# Patient Record
Sex: MALE | Race: WHITE | URBAN - METROPOLITAN AREA
[De-identification: names, ages, dates, MRNs, and addresses within clinical notes are randomized per-mention and may not be internally consistent; named-entity substitution may affect disease eponyms.]

---

## 2018-10-31 PROBLEM — R01.1 MURMUR: Status: ACTIVE | Noted: 2018-10-31

## 2018-10-31 PROBLEM — R06.02 SOB (SHORTNESS OF BREATH): Status: ACTIVE | Noted: 2018-10-31

## 2019-02-26 PROBLEM — R00.1 BRADYCARDIA: Status: ACTIVE | Noted: 2019-02-26

## 2023-09-15 RX ORDER — TADALAFIL 5 MG/1
TABLET ORAL
Qty: 90 TABLET | Refills: 0 | Status: SHIPPED | OUTPATIENT
Start: 2023-09-15

## 2023-09-15 NOTE — TELEPHONE ENCOUNTER
Yusuf Disla is calling to request a refill on the following medication(s):    Medication Request:  Requested Prescriptions     Pending Prescriptions Disp Refills    tadalafil (CIALIS) 5 MG tablet [Pharmacy Med Name: Tadalafil 5 MG Oral Tablet] 90 tablet 0     Sig: TAKE 1 TABLET BY MOUTH ONCE DAILY AS NEEDED       Last Visit Date (If Applicable):  Visit date not found    Next Visit Date:    Visit date not found

## 2023-09-26 RX ORDER — CELECOXIB 100 MG/1
CAPSULE ORAL
Qty: 180 CAPSULE | Refills: 3 | Status: SHIPPED | OUTPATIENT
Start: 2023-09-26

## 2023-09-26 NOTE — TELEPHONE ENCOUNTER
Risa Blackwood is calling to request a refill on the following medication(s):    Medication Request:  Requested Prescriptions     Pending Prescriptions Disp Refills    celecoxib (CELEBREX) 100 MG capsule [Pharmacy Med Name: CELECOXIB CAPS 100MG] 180 capsule 3     Sig: TAKE 1 CAPSULE TWICE A DAY WITH FOOD       Last Visit Date (If Applicable):  Visit date not found    Next Visit Date:    Visit date not found

## 2023-12-11 RX ORDER — TADALAFIL 5 MG/1
TABLET ORAL
Qty: 90 TABLET | Refills: 0 | Status: SHIPPED | OUTPATIENT
Start: 2023-12-11

## 2023-12-11 NOTE — TELEPHONE ENCOUNTER
Greer Torres is calling to request a refill on the following medication(s):    Medication Request:  Requested Prescriptions     Pending Prescriptions Disp Refills    tadalafil (CIALIS) 5 MG tablet [Pharmacy Med Name: Tadalafil 5 MG Oral Tablet] 90 tablet 0     Sig: TAKE 1 TABLET BY MOUTH ONCE DAILY AS NEEDED       Last Visit Date (If Applicable):  Visit date not found    Next Visit Date:    Visit date not found

## 2023-12-14 ENCOUNTER — OFFICE VISIT (OUTPATIENT)
Age: 73
End: 2023-12-14
Payer: COMMERCIAL

## 2023-12-14 VITALS
OXYGEN SATURATION: 96 % | SYSTOLIC BLOOD PRESSURE: 144 MMHG | HEIGHT: 70 IN | RESPIRATION RATE: 12 BRPM | DIASTOLIC BLOOD PRESSURE: 98 MMHG | TEMPERATURE: 98.7 F | BODY MASS INDEX: 33.36 KG/M2 | WEIGHT: 233 LBS | HEART RATE: 54 BPM

## 2023-12-14 DIAGNOSIS — H65.03 NON-RECURRENT ACUTE SEROUS OTITIS MEDIA OF BOTH EARS: ICD-10-CM

## 2023-12-14 DIAGNOSIS — I10 ESSENTIAL HYPERTENSION: ICD-10-CM

## 2023-12-14 DIAGNOSIS — R42 VERTIGO: Primary | ICD-10-CM

## 2023-12-14 PROBLEM — N52.9 IMPOTENCE: Status: ACTIVE | Noted: 2023-12-14

## 2023-12-14 PROBLEM — E78.00 HYPERCHOLESTEROLEMIA: Status: ACTIVE | Noted: 2023-12-14

## 2023-12-14 PROBLEM — J40 BRONCHITIS: Status: ACTIVE | Noted: 2023-12-14

## 2023-12-14 PROCEDURE — 1123F ACP DISCUSS/DSCN MKR DOCD: CPT | Performed by: FAMILY MEDICINE

## 2023-12-14 PROCEDURE — 3080F DIAST BP >= 90 MM HG: CPT | Performed by: FAMILY MEDICINE

## 2023-12-14 PROCEDURE — 3077F SYST BP >= 140 MM HG: CPT | Performed by: FAMILY MEDICINE

## 2023-12-14 PROCEDURE — 99213 OFFICE O/P EST LOW 20 MIN: CPT | Performed by: FAMILY MEDICINE

## 2023-12-14 RX ORDER — CEPHALEXIN 500 MG/1
500 CAPSULE ORAL 3 TIMES DAILY
Qty: 30 CAPSULE | Refills: 0 | Status: SHIPPED | OUTPATIENT
Start: 2023-12-14 | End: 2023-12-24

## 2023-12-14 RX ORDER — VALSARTAN 160 MG/1
TABLET ORAL
COMMUNITY
Start: 2023-10-03

## 2023-12-14 RX ORDER — ATORVASTATIN CALCIUM 80 MG/1
TABLET, FILM COATED ORAL
COMMUNITY
Start: 2023-10-08

## 2023-12-14 RX ORDER — MECLIZINE HCL 12.5 MG/1
12.5 TABLET ORAL 3 TIMES DAILY PRN
Qty: 30 TABLET | Refills: 1 | Status: SHIPPED | OUTPATIENT
Start: 2023-12-14 | End: 2023-12-24

## 2023-12-14 RX ORDER — FLUTICASONE PROPIONATE 50 MCG
2 SPRAY, SUSPENSION (ML) NASAL DAILY
Qty: 16 G | Refills: 0 | Status: SHIPPED | OUTPATIENT
Start: 2023-12-14

## 2023-12-14 SDOH — ECONOMIC STABILITY: HOUSING INSECURITY
IN THE LAST 12 MONTHS, WAS THERE A TIME WHEN YOU DID NOT HAVE A STEADY PLACE TO SLEEP OR SLEPT IN A SHELTER (INCLUDING NOW)?: NO

## 2023-12-14 SDOH — ECONOMIC STABILITY: FOOD INSECURITY: WITHIN THE PAST 12 MONTHS, THE FOOD YOU BOUGHT JUST DIDN'T LAST AND YOU DIDN'T HAVE MONEY TO GET MORE.: NEVER TRUE

## 2023-12-14 SDOH — ECONOMIC STABILITY: INCOME INSECURITY: HOW HARD IS IT FOR YOU TO PAY FOR THE VERY BASICS LIKE FOOD, HOUSING, MEDICAL CARE, AND HEATING?: NOT HARD AT ALL

## 2023-12-14 SDOH — ECONOMIC STABILITY: FOOD INSECURITY: WITHIN THE PAST 12 MONTHS, YOU WORRIED THAT YOUR FOOD WOULD RUN OUT BEFORE YOU GOT MONEY TO BUY MORE.: NEVER TRUE

## 2023-12-14 NOTE — PROGRESS NOTES
[facility]     Date of Visit:  2023  Patient Name: Eris Anders   Patient :  1950     CHIEF COMPLAINT/HPI:     Eris Anders is a 68 y.o. 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[employer] and does do a lot of head motion seems to make things worse no nausea or vomiting he had similar incidents about a year ago    REVIEW OF SYSTEM      Review of Systems  No fever no sweats no chills no nausea no vomiting no diarrhea he does have balance disorder especially with head motion some frontal sinus congestion no fever sweats or chills    REVIEWED INFORMATION      Allergies   Allergen Reactions    Latex     Alcohol     Dust Mite Extract        Current Outpatient Medications   Medication Sig Note Dispense Refill    atorvastatin (LIPITOR) 80 MG tablet        valsartan (DIOVAN) 160 MG tablet        cephALEXin (KEFLEX) 500 MG capsule Take 1 capsule by mouth 3 times daily for 10 days  30 capsule 0    fluticasone (FLONASE) 50 MCG/ACT nasal spray 2 sprays by Each Nostril route daily  16 g 0    meclizine (ANTIVERT) 12.5 MG tablet Take 1 tablet by mouth 3 times daily as needed for Dizziness  30 tablet 1    tadalafil (CIALIS) 5 MG tablet TAKE 1 TABLET BY MOUTH ONCE DAILY AS NEEDED  90 tablet 0    celecoxib (CELEBREX) 100 MG capsule TAKE 1 CAPSULE TWICE A DAY WITH FOOD  180 capsule 3    aspirin 81 MG tablet Take 1 tablet by mouth daily       valsartan (DIOVAN) 80 MG tablet TAKE 1 TABLET DAILY (Patient not taking: Reported on 2023)  90 tablet 2    atorvastatin (LIPITOR) 40 MG tablet TAKE 1 TABLET DAILY (Patient not taking: Reported on 2023)  90 tablet 3    TOPROL XL 25 MG XL tablet Take 12.5 mg by mouth daily (Patient not taking: Reported on 2023) 2016: Received

## 2023-12-26 ENCOUNTER — OFFICE VISIT (OUTPATIENT)
Age: 73
End: 2023-12-26
Payer: COMMERCIAL

## 2023-12-26 VITALS
TEMPERATURE: 97.2 F | HEART RATE: 57 BPM | SYSTOLIC BLOOD PRESSURE: 138 MMHG | WEIGHT: 230.2 LBS | DIASTOLIC BLOOD PRESSURE: 86 MMHG | HEIGHT: 70 IN | OXYGEN SATURATION: 95 % | RESPIRATION RATE: 18 BRPM | BODY MASS INDEX: 32.96 KG/M2

## 2023-12-26 DIAGNOSIS — J40 BRONCHITIS: ICD-10-CM

## 2023-12-26 DIAGNOSIS — J01.00 ACUTE NON-RECURRENT MAXILLARY SINUSITIS: Primary | ICD-10-CM

## 2023-12-26 PROCEDURE — 99213 OFFICE O/P EST LOW 20 MIN: CPT | Performed by: FAMILY MEDICINE

## 2023-12-26 PROCEDURE — 1123F ACP DISCUSS/DSCN MKR DOCD: CPT | Performed by: FAMILY MEDICINE

## 2023-12-26 PROCEDURE — 3079F DIAST BP 80-89 MM HG: CPT | Performed by: FAMILY MEDICINE

## 2023-12-26 PROCEDURE — 3075F SYST BP GE 130 - 139MM HG: CPT | Performed by: FAMILY MEDICINE

## 2023-12-26 RX ORDER — METHYLPREDNISOLONE ACETATE 80 MG/ML
80 INJECTION, SUSPENSION INTRA-ARTICULAR; INTRALESIONAL; INTRAMUSCULAR; SOFT TISSUE ONCE
Status: COMPLETED | OUTPATIENT
Start: 2023-12-26 | End: 2023-12-26

## 2023-12-26 RX ORDER — BENZONATATE 200 MG/1
200 CAPSULE ORAL 3 TIMES DAILY PRN
Qty: 30 CAPSULE | Refills: 0 | Status: SHIPPED | OUTPATIENT
Start: 2023-12-26 | End: 2024-01-02

## 2023-12-26 RX ORDER — CLINDAMYCIN HYDROCHLORIDE 300 MG/1
300 CAPSULE ORAL 3 TIMES DAILY
Qty: 30 CAPSULE | Refills: 0 | Status: SHIPPED | OUTPATIENT
Start: 2023-12-26 | End: 2024-01-05

## 2023-12-26 RX ADMIN — METHYLPREDNISOLONE ACETATE 80 MG: 80 INJECTION, SUSPENSION INTRA-ARTICULAR; INTRALESIONAL; INTRAMUSCULAR; SOFT TISSUE at 15:23

## 2023-12-26 NOTE — PROGRESS NOTES
[facility]      Date of Visit:  2023  [facility]   Patient :  1950     CHIEF COMPLAINT/HPI:     Jory Spence is a 68 y.o. male who presents today for an general visit to be evaluated for the following condition(s):  Chief Complaint   Patient presents with    Cough     Patient is here for ongoing cough and congestion that has gotten worse. States last week when he was here he was having issues with dizziness but that has subsided. States productive cough w/green mucus      Patient recently has developed a fairly severe sinus and bronchial infection. He has cough maybe some low-grade fever at home he has been off work since the . Laryngitis also.   REVIEW OF SYSTEM      Review of Systems  May be low-grade fever at home some sweats and chills he is not sleeping well because of congestion he has lots of nasal congestion and rhinorrhea and coughing up some yellowish material no nausea no vomiting energy level is poor he does have laryngitis also    REVIEWED INFORMATION      Allergies   Allergen Reactions    Latex     Alcohol     Dust Mite Extract     Cephalexin Rash       Current Outpatient Medications   Medication Sig Dispense Refill    clindamycin (CLEOCIN) 300 MG capsule Take 1 capsule by mouth 3 times daily for 10 days 30 capsule 0    benzonatate (TESSALON) 200 MG capsule Take 1 capsule by mouth 3 times daily as needed for Cough 30 capsule 0    atorvastatin (LIPITOR) 80 MG tablet Take 1 tablet by mouth daily      valsartan (DIOVAN) 160 MG tablet Take 1 tablet by mouth daily      fluticasone (FLONASE) 50 MCG/ACT nasal spray 2 sprays by Each Nostril route daily 16 g 0    tadalafil (CIALIS) 5 MG tablet TAKE 1 TABLET BY MOUTH ONCE DAILY AS NEEDED 90 tablet 0    celecoxib (CELEBREX) 100 MG capsule TAKE 1 CAPSULE TWICE A DAY WITH FOOD 180 capsule 3    aspirin 81 MG tablet Take 1 tablet by mouth daily       Current Facility-Administered Medications   Medication Dose

## 2023-12-26 NOTE — PROGRESS NOTES
Depo 80mg was given to patient for Bronchitis. Tolerated well. Approved by Dr. Monae Amezcua.  Injection was given let hip

## 2023-12-29 ENCOUNTER — TELEPHONE (OUTPATIENT)
Age: 73
End: 2023-12-29

## 2023-12-29 NOTE — TELEPHONE ENCOUNTER
Patient is calling about a doctor note for the [address] and he asking for it to extended to January 2nd.  He is using his ARAM and we are closed Monday

## 2024-01-03 ENCOUNTER — TELEPHONE (OUTPATIENT)
Age: 74
End: 2024-01-03

## 2024-01-03 ENCOUNTER — OFFICE VISIT (OUTPATIENT)
Age: 74
End: 2024-01-03
Payer: COMMERCIAL

## 2024-01-03 VITALS
BODY MASS INDEX: 32.93 KG/M2 | SYSTOLIC BLOOD PRESSURE: 136 MMHG | DIASTOLIC BLOOD PRESSURE: 86 MMHG | OXYGEN SATURATION: 96 % | WEIGHT: 230 LBS | TEMPERATURE: 98.4 F | HEIGHT: 70 IN | HEART RATE: 61 BPM

## 2024-01-03 DIAGNOSIS — J01.10 ACUTE NON-RECURRENT FRONTAL SINUSITIS: ICD-10-CM

## 2024-01-03 DIAGNOSIS — J04.10 TRACHEITIS: Primary | ICD-10-CM

## 2024-01-03 PROCEDURE — 3075F SYST BP GE 130 - 139MM HG: CPT | Performed by: FAMILY MEDICINE

## 2024-01-03 PROCEDURE — 1123F ACP DISCUSS/DSCN MKR DOCD: CPT | Performed by: FAMILY MEDICINE

## 2024-01-03 PROCEDURE — 99213 OFFICE O/P EST LOW 20 MIN: CPT | Performed by: FAMILY MEDICINE

## 2024-01-03 PROCEDURE — 3079F DIAST BP 80-89 MM HG: CPT | Performed by: FAMILY MEDICINE

## 2024-01-03 RX ORDER — DOXYCYCLINE HYCLATE 100 MG
100 TABLET ORAL 2 TIMES DAILY
Qty: 20 TABLET | Refills: 0 | Status: SHIPPED | OUTPATIENT
Start: 2024-01-03 | End: 2024-01-13

## 2024-01-03 RX ORDER — HYDROCODONE POLISTIREX AND CHLORPHENIRAMINE POLISTIREX 10; 8 MG/5ML; MG/5ML
5 SUSPENSION, EXTENDED RELEASE ORAL EVERY 12 HOURS PRN
Qty: 118 ML | Refills: 0 | Status: SHIPPED | OUTPATIENT
Start: 2024-01-03 | End: 2024-01-10

## 2024-01-03 RX ORDER — PREDNISONE 10 MG/1
10 TABLET ORAL 2 TIMES DAILY
Qty: 10 TABLET | Refills: 0 | Status: SHIPPED | OUTPATIENT
Start: 2024-01-03 | End: 2024-01-08

## 2024-01-03 ASSESSMENT — PATIENT HEALTH QUESTIONNAIRE - PHQ9
SUM OF ALL RESPONSES TO PHQ9 QUESTIONS 1 & 2: 0
SUM OF ALL RESPONSES TO PHQ QUESTIONS 1-9: 0
SUM OF ALL RESPONSES TO PHQ QUESTIONS 1-9: 0
1. LITTLE INTEREST OR PLEASURE IN DOING THINGS: 0
SUM OF ALL RESPONSES TO PHQ QUESTIONS 1-9: 0
2. FEELING DOWN, DEPRESSED OR HOPELESS: 0
SUM OF ALL RESPONSES TO PHQ QUESTIONS 1-9: 0

## 2024-01-03 NOTE — TELEPHONE ENCOUNTER
Patient  called  and  he  is  on  his  second  round  of  antibiotic  and  he  is still  blowing  out  green  mucous  from  his  nose  and  he  is  not  feeling  well and  needs  to  go  back  to  work  tomorrow  do you need  to  see him  again

## 2024-01-03 NOTE — PROGRESS NOTES
[facility]     Date of Visit:  1/3/2024  Patient Name: Cameron Washington   Patient :  1950     CHIEF COMPLAINT/HPI:     Cameron Washington is a 73 y.o. male who presents today for an general visit to be evaluated for the following condition(s):  Chief Complaint   Patient presents with    Cough     Patient is on 2nd round of antibiotics for cough/congestion. Still not getting much relief.      Patient continues with sinus congestion frontal sinus pressure postnasal drainage and laryngitis.  Recent chest x-ray was clear.  REVIEW OF SYSTEM      Review of Systems  No fever no chills he does have sinus pressure especially frontal sinuses rhinorrhea postnasal drainage laryngitis cough    REVIEWED INFORMATION      Allergies   Allergen Reactions    Latex     Alcohol     Dust Mite Extract     Cephalexin Rash       Current Outpatient Medications   Medication Sig Dispense Refill    predniSONE (DELTASONE) 10 MG tablet Take 1 tablet by mouth 2 times daily for 5 days 10 tablet 0    doxycycline hyclate (VIBRA-TABS) 100 MG tablet Take 1 tablet by mouth 2 times daily for 10 days 20 tablet 0    HYDROcodone-chlorpheniramine (TUSSIONEX) 10-8 MG/5ML SUER Take 5 mLs by mouth every 12 hours as needed (cough) for up to 7 days. Max Daily Amount: 10 mLs 118 mL 0    atorvastatin (LIPITOR) 80 MG tablet Take 1 tablet by mouth daily      valsartan (DIOVAN) 160 MG tablet Take 1 tablet by mouth daily      fluticasone (FLONASE) 50 MCG/ACT nasal spray 2 sprays by Each Nostril route daily 16 g 0    tadalafil (CIALIS) 5 MG tablet TAKE 1 TABLET BY MOUTH ONCE DAILY AS NEEDED 90 tablet 0    celecoxib (CELEBREX) 100 MG capsule TAKE 1 CAPSULE TWICE A DAY WITH FOOD 180 capsule 3    aspirin 81 MG tablet Take 1 tablet by mouth daily       No current facility-administered medications for this visit.        Patient Active Problem List   Diagnosis    Coronary artery disease involving native coronary artery of native heart with angina pectoris

## 2024-01-12 ENCOUNTER — TELEPHONE (OUTPATIENT)
Age: 74
End: 2024-01-12

## 2024-02-12 ENCOUNTER — TELEPHONE (OUTPATIENT)
Age: 74
End: 2024-02-12

## 2024-02-12 RX ORDER — DOXYCYCLINE HYCLATE 100 MG
100 TABLET ORAL 2 TIMES DAILY
Qty: 20 TABLET | Refills: 0 | Status: SHIPPED | OUTPATIENT
Start: 2024-02-12 | End: 2024-02-22

## 2024-02-12 NOTE — TELEPHONE ENCOUNTER
Patient having head congestion again but this time having some dizzy spells. Symptoms started yesterday.    Both ears are plugged.  Has been using flonase    Can something be called in?  Apt needed?    Patient aware Dr FARRAR not in today - ok to wait for response tomorrow

## 2024-02-17 ENCOUNTER — APPOINTMENT (OUTPATIENT)
Dept: CT IMAGING | Age: 74
End: 2024-02-17
Payer: COMMERCIAL

## 2024-02-17 ENCOUNTER — APPOINTMENT (OUTPATIENT)
Dept: GENERAL RADIOLOGY | Age: 74
End: 2024-02-17
Payer: COMMERCIAL

## 2024-02-17 ENCOUNTER — HOSPITAL ENCOUNTER (EMERGENCY)
Age: 74
Discharge: HOME | End: 2024-02-17
Attending: STUDENT IN AN ORGANIZED HEALTH CARE EDUCATION/TRAINING PROGRAM
Payer: COMMERCIAL

## 2024-02-17 VITALS
OXYGEN SATURATION: 97 % | BODY MASS INDEX: 33.64 KG/M2 | HEART RATE: 55 BPM | RESPIRATION RATE: 17 BRPM | DIASTOLIC BLOOD PRESSURE: 81 MMHG | TEMPERATURE: 98.2 F | SYSTOLIC BLOOD PRESSURE: 143 MMHG | HEIGHT: 70 IN | WEIGHT: 235 LBS

## 2024-02-17 DIAGNOSIS — R11.2 NAUSEA AND VOMITING, UNSPECIFIED VOMITING TYPE: ICD-10-CM

## 2024-02-17 DIAGNOSIS — R42 DIZZINESS: Primary | ICD-10-CM

## 2024-02-17 LAB
ALBUMIN SERPL-MCNC: 3.9 G/DL (ref 3.5–5.2)
ALP SERPL-CCNC: 57 U/L (ref 40–129)
ALT SERPL-CCNC: 23 U/L (ref 5–41)
ANION GAP SERPL CALCULATED.3IONS-SCNC: 12 MMOL/L (ref 9–17)
AST SERPL-CCNC: 19 U/L
BASOPHILS # BLD: 0 K/UL (ref 0–0.2)
BASOPHILS NFR BLD: 0 % (ref 0–2)
BILIRUB SERPL-MCNC: 0.5 MG/DL (ref 0.3–1.2)
BUN SERPL-MCNC: 19 MG/DL (ref 8–23)
CALCIUM SERPL-MCNC: 8.9 MG/DL (ref 8.6–10.4)
CHLORIDE SERPL-SCNC: 103 MMOL/L (ref 98–107)
CO2 SERPL-SCNC: 24 MMOL/L (ref 20–31)
CREAT SERPL-MCNC: 0.8 MG/DL (ref 0.7–1.2)
EKG ATRIAL RATE: 53 BPM
EKG P AXIS: 42 DEGREES
EKG P-R INTERVAL: 174 MS
EKG Q-T INTERVAL: 472 MS
EKG QRS DURATION: 114 MS
EKG QTC CALCULATION (BAZETT): 442 MS
EKG R AXIS: 25 DEGREES
EKG T AXIS: 104 DEGREES
EKG VENTRICULAR RATE: 53 BPM
EOSINOPHIL # BLD: 0.1 K/UL (ref 0–0.4)
EOSINOPHILS RELATIVE PERCENT: 1 % (ref 0–4)
ERYTHROCYTE [DISTWIDTH] IN BLOOD BY AUTOMATED COUNT: 14.7 % (ref 11.5–14.9)
FLUAV RNA RESP QL NAA+PROBE: NOT DETECTED
FLUBV RNA RESP QL NAA+PROBE: NOT DETECTED
GFR SERPL CREATININE-BSD FRML MDRD: >60 ML/MIN/1.73M2
GLUCOSE SERPL-MCNC: 147 MG/DL (ref 70–99)
HCT VFR BLD AUTO: 41.1 % (ref 41–53)
HGB BLD-MCNC: 13.9 G/DL (ref 13.5–17.5)
INR PPP: 1
LYMPHOCYTES NFR BLD: 3.3 K/UL (ref 1–4.8)
LYMPHOCYTES RELATIVE PERCENT: 38 % (ref 24–44)
MAGNESIUM SERPL-MCNC: 2 MG/DL (ref 1.6–2.6)
MCH RBC QN AUTO: 31 PG (ref 26–34)
MCHC RBC AUTO-ENTMCNC: 33.8 G/DL (ref 31–37)
MCV RBC AUTO: 91.7 FL (ref 80–100)
MONOCYTES NFR BLD: 0.6 K/UL (ref 0.1–1.3)
MONOCYTES NFR BLD: 7 % (ref 1–7)
NEUTROPHILS NFR BLD: 54 % (ref 36–66)
NEUTS SEG NFR BLD: 4.6 K/UL (ref 1.3–9.1)
PLATELET # BLD AUTO: 236 K/UL (ref 150–450)
PMV BLD AUTO: 7.2 FL (ref 6–12)
POTASSIUM SERPL-SCNC: 3.2 MMOL/L (ref 3.7–5.3)
PROT SERPL-MCNC: 6.3 G/DL (ref 6.4–8.3)
PROTHROMBIN TIME: 13.4 SEC (ref 11.8–14.6)
RBC # BLD AUTO: 4.48 M/UL (ref 4.5–5.9)
SARS-COV-2 RNA RESP QL NAA+PROBE: NOT DETECTED
SODIUM SERPL-SCNC: 139 MMOL/L (ref 135–144)
SOURCE: NORMAL
SPECIMEN DESCRIPTION: NORMAL
TROPONIN I SERPL HS-MCNC: 14 NG/L (ref 0–22)
WBC OTHER # BLD: 8.7 K/UL (ref 3.5–11)

## 2024-02-17 PROCEDURE — 87636 SARSCOV2 & INF A&B AMP PRB: CPT

## 2024-02-17 PROCEDURE — 70450 CT HEAD/BRAIN W/O DYE: CPT

## 2024-02-17 PROCEDURE — 6360000002 HC RX W HCPCS: Performed by: STUDENT IN AN ORGANIZED HEALTH CARE EDUCATION/TRAINING PROGRAM

## 2024-02-17 PROCEDURE — 36415 COLL VENOUS BLD VENIPUNCTURE: CPT

## 2024-02-17 PROCEDURE — 85025 COMPLETE CBC W/AUTO DIFF WBC: CPT

## 2024-02-17 PROCEDURE — 84484 ASSAY OF TROPONIN QUANT: CPT

## 2024-02-17 PROCEDURE — 93005 ELECTROCARDIOGRAM TRACING: CPT | Performed by: STUDENT IN AN ORGANIZED HEALTH CARE EDUCATION/TRAINING PROGRAM

## 2024-02-17 PROCEDURE — 71045 X-RAY EXAM CHEST 1 VIEW: CPT

## 2024-02-17 PROCEDURE — 96374 THER/PROPH/DIAG INJ IV PUSH: CPT

## 2024-02-17 PROCEDURE — 70498 CT ANGIOGRAPHY NECK: CPT

## 2024-02-17 PROCEDURE — 99285 EMERGENCY DEPT VISIT HI MDM: CPT

## 2024-02-17 PROCEDURE — 6370000000 HC RX 637 (ALT 250 FOR IP): Performed by: STUDENT IN AN ORGANIZED HEALTH CARE EDUCATION/TRAINING PROGRAM

## 2024-02-17 PROCEDURE — 85610 PROTHROMBIN TIME: CPT

## 2024-02-17 PROCEDURE — 2580000003 HC RX 258: Performed by: STUDENT IN AN ORGANIZED HEALTH CARE EDUCATION/TRAINING PROGRAM

## 2024-02-17 PROCEDURE — 6360000004 HC RX CONTRAST MEDICATION: Performed by: STUDENT IN AN ORGANIZED HEALTH CARE EDUCATION/TRAINING PROGRAM

## 2024-02-17 PROCEDURE — 83735 ASSAY OF MAGNESIUM: CPT

## 2024-02-17 PROCEDURE — 96375 TX/PRO/DX INJ NEW DRUG ADDON: CPT

## 2024-02-17 PROCEDURE — 80053 COMPREHEN METABOLIC PANEL: CPT

## 2024-02-17 RX ORDER — POTASSIUM CHLORIDE 20 MEQ/1
40 TABLET, EXTENDED RELEASE ORAL ONCE
Status: COMPLETED | OUTPATIENT
Start: 2024-02-17 | End: 2024-02-17

## 2024-02-17 RX ORDER — ONDANSETRON 4 MG/1
4 TABLET, ORALLY DISINTEGRATING ORAL 3 TIMES DAILY PRN
Qty: 21 TABLET | Refills: 0 | Status: SHIPPED | OUTPATIENT
Start: 2024-02-17

## 2024-02-17 RX ORDER — 0.9 % SODIUM CHLORIDE 0.9 %
1000 INTRAVENOUS SOLUTION INTRAVENOUS ONCE
Status: COMPLETED | OUTPATIENT
Start: 2024-02-17 | End: 2024-02-17

## 2024-02-17 RX ORDER — SODIUM CHLORIDE 0.9 % (FLUSH) 0.9 %
10 SYRINGE (ML) INJECTION PRN
Status: DISCONTINUED | OUTPATIENT
Start: 2024-02-17 | End: 2024-02-17 | Stop reason: HOSPADM

## 2024-02-17 RX ORDER — METOCLOPRAMIDE HYDROCHLORIDE 5 MG/ML
10 INJECTION INTRAMUSCULAR; INTRAVENOUS ONCE
Status: COMPLETED | OUTPATIENT
Start: 2024-02-17 | End: 2024-02-17

## 2024-02-17 RX ORDER — 0.9 % SODIUM CHLORIDE 0.9 %
100 INTRAVENOUS SOLUTION INTRAVENOUS ONCE
Status: COMPLETED | OUTPATIENT
Start: 2024-02-17 | End: 2024-02-17

## 2024-02-17 RX ORDER — MECLIZINE HYDROCHLORIDE 25 MG/1
25 TABLET ORAL 3 TIMES DAILY PRN
Qty: 30 TABLET | Refills: 0 | Status: SHIPPED | OUTPATIENT
Start: 2024-02-17 | End: 2024-02-27

## 2024-02-17 RX ORDER — DIPHENHYDRAMINE HYDROCHLORIDE 50 MG/ML
25 INJECTION INTRAMUSCULAR; INTRAVENOUS ONCE
Status: COMPLETED | OUTPATIENT
Start: 2024-02-17 | End: 2024-02-17

## 2024-02-17 RX ADMIN — SODIUM CHLORIDE, PRESERVATIVE FREE 10 ML: 5 INJECTION INTRAVENOUS at 05:22

## 2024-02-17 RX ADMIN — SODIUM CHLORIDE 100 ML: 9 INJECTION, SOLUTION INTRAVENOUS at 05:22

## 2024-02-17 RX ADMIN — IOPAMIDOL 75 ML: 755 INJECTION, SOLUTION INTRAVENOUS at 05:21

## 2024-02-17 RX ADMIN — SODIUM CHLORIDE 1000 ML: 9 INJECTION, SOLUTION INTRAVENOUS at 04:14

## 2024-02-17 RX ADMIN — DIPHENHYDRAMINE HYDROCHLORIDE 25 MG: 50 INJECTION INTRAMUSCULAR; INTRAVENOUS at 04:15

## 2024-02-17 RX ADMIN — POTASSIUM CHLORIDE 40 MEQ: 1500 TABLET, EXTENDED RELEASE ORAL at 05:42

## 2024-02-17 RX ADMIN — METOCLOPRAMIDE 10 MG: 5 INJECTION, SOLUTION INTRAMUSCULAR; INTRAVENOUS at 04:16

## 2024-02-17 ASSESSMENT — LIFESTYLE VARIABLES
HOW MANY STANDARD DRINKS CONTAINING ALCOHOL DO YOU HAVE ON A TYPICAL DAY: PATIENT DOES NOT DRINK
HOW OFTEN DO YOU HAVE A DRINK CONTAINING ALCOHOL: NEVER

## 2024-02-17 ASSESSMENT — PAIN - FUNCTIONAL ASSESSMENT: PAIN_FUNCTIONAL_ASSESSMENT: NONE - DENIES PAIN

## 2024-02-17 NOTE — ED NOTES
Patient states he does not feel any dizziness, no nausea, and states he feels much better now than earlier.

## 2024-02-17 NOTE — ED NOTES
Discharge instructions reviewed with patient, noting all directions and education by provider. Patient verbalizes understanding of all information reviewed, gathered personal items, and transferred under own power off unit to lobby without incident.

## 2024-02-17 NOTE — ED NOTES
Mode of arrival (squad #, walk in, police, etc) : EMS        Chief complaint(s): dizziness, hypertension, nausea and vomiting    Above symptoms started earlier today. EMS given Nitro and Zofran IV. Iv established at left dorsal FA gauge 18.        C= \"Have you ever felt that you should Cut down on your drinking?\"  No  A= \"Have people Annoyed you by criticizing your drinking?\"  No  G= \"Have you ever felt bad or Guilty about your drinking?\"  No  E= \"Have you ever had a drink as an Eye-opener first thing in the morning to steady your nerves or to help a hangover?\"  No      Deferred []      Reason for deferring: N/A    *If yes to two or more: probable alcohol abuse.*

## 2024-02-17 NOTE — ED PROVIDER NOTES
EMERGENCY DEPARTMENT ENCOUNTER    Pt Name: Cameron Washington  MRN: [id number]  Birthdate 1950  Date of evaluation: 2/17/24  CHIEF COMPLAINT       Chief Complaint   Patient presents with    Nausea    Emesis    Dizziness     HISTORY OF PRESENT ILLNESS   73-year-old history of hypertension, hyperlipidemia, CAD presenting with dizziness    Patient states he has had episodes of vertigo in the past    Over the last week it has been getting worse    He describes a room spinning and feeling dizzy and lightheaded with some nausea associated vomiting    Denies any numbness tingling weakness slurred speech blurry vision or double vision              REVIEW OF SYSTEMS     Review of Systems   Constitutional:  Negative for chills and fever.   HENT:  Negative for rhinorrhea and sore throat.    Eyes:  Negative for discharge and redness.   Respiratory:  Negative for chest tightness and shortness of breath.    Cardiovascular:  Negative for chest pain.   Gastrointestinal:  Negative for abdominal pain, diarrhea, nausea and vomiting.   Genitourinary:  Negative for dysuria and frequency.   Musculoskeletal:  Negative for arthralgias and myalgias.   Skin:  Negative for rash.   Neurological:  Positive for dizziness and light-headedness. Negative for weakness and numbness.   Psychiatric/Behavioral:  Negative for suicidal ideas.    All other systems reviewed and are negative.    PASTMEDICAL HISTORY   No past medical history on file.  Past Problem List  Patient Active Problem List   Diagnosis Code    Coronary artery disease involving native coronary artery of native heart with angina pectoris (HCC) I25.119    Essential hypertension I10    Mixed hyperlipidemia E78.2    SOB (shortness of breath) R06.02    Murmur R01.1    Bradycardia R00.1    Bronchitis J40    Hypertension I10    Hypercholesterolemia E78.00    Impotence N52.9     SURGICAL HISTORY     No past surgical history on file.  CURRENT MEDICATIONS       Previous Medications    ASPIRIN 81  infiltrates representing atelectasis versus pneumonia. [GUILLERMO]   0544 CT Head W/O Contrast  No acute intracranial abnormality.      [GUILLERMO]   0609 COVID-19 & Influenza Combo:    Specimen Description .NASOPHARYNGEAL SWAB   SOURCE, [id number] .NASOPHARYNGEAL SWAB   SARS-CoV-2 RNA, RT PCR Not Detected   INFLUENZA A Not Detected   INFLUENZA B Not Detected  negative [GUILLERMO]   0612 CTA HEAD NECK W CONTRAST  No significant stenosis or evidence for occlusion. [GUILLERMO]      ED Course User Index  [GUILLERMO] Hernan Yañez MD       Patient repeat assessment: Symptoms resolved    Disposition discussion with patient/family, Shared Decision Making: Discussed with the patient Zofran and meclizine as needed for dizziness    Although the primary doctor NuPrep return immediately for numbness ting weakness slurred speech      73-year-old dizziness has had this in the past    Workup is reassuring including CT head CTA    Symptoms resolved after treatment here    Suspect of peripheral vertigo we will have him follow-up with the primary doctor return for any worsening      PROCEDURES:    EKG 12 Lead    Date/Time: 2/17/2024 4:38 AM    Performed by: Hernan Yañez MD  Authorized by: Hernan Yañez MD    ECG interpreted by ED Physician in the absence of a cardiologist: yes    Interpretation:     Interpretation: abnormal    Rate:     ECG rate:  52    ECG rate assessment: bradycardic    Rhythm:     Rhythm: sinus bradycardia    Ectopy:     Ectopy: none    QRS:     QRS axis:  Normal    QRS intervals:  Normal    QRS conduction: normal    ST segments:     ST segments:  Normal  T waves:     T waves: inverted      Inverted:  V4, V5 and V6        DATA FOR LAB AND RADIOLOGY TESTS ORDERED BELOW ARE REVIEWED BY THE ED CLINICIAN:    RADIOLOGY: All x-rays, CT, MRI, and formal ultrasound images (except ED bedside ultrasound) are read by the radiologist, see reports below, unless otherwise noted in MDM or here.  Reports below are reviewed by myself.  CT Head W/O

## 2024-02-20 ENCOUNTER — OFFICE VISIT (OUTPATIENT)
Age: 74
End: 2024-02-20
Payer: COMMERCIAL

## 2024-02-20 VITALS
DIASTOLIC BLOOD PRESSURE: 70 MMHG | WEIGHT: 236.6 LBS | BODY MASS INDEX: 33.95 KG/M2 | RESPIRATION RATE: 14 BRPM | HEART RATE: 51 BPM | SYSTOLIC BLOOD PRESSURE: 130 MMHG | TEMPERATURE: 97.8 F | OXYGEN SATURATION: 97 %

## 2024-02-20 DIAGNOSIS — I10 ESSENTIAL HYPERTENSION: ICD-10-CM

## 2024-02-20 DIAGNOSIS — R42 VERTIGO: Primary | ICD-10-CM

## 2024-02-20 DIAGNOSIS — J01.10 ACUTE NON-RECURRENT FRONTAL SINUSITIS: ICD-10-CM

## 2024-02-20 LAB
EKG ATRIAL RATE: 52 BPM
EKG P AXIS: 46 DEGREES
EKG P-R INTERVAL: 176 MS
EKG Q-T INTERVAL: 474 MS
EKG QRS DURATION: 114 MS
EKG QTC CALCULATION (BAZETT): 440 MS
EKG R AXIS: 26 DEGREES
EKG T AXIS: 110 DEGREES
EKG VENTRICULAR RATE: 52 BPM

## 2024-02-20 PROCEDURE — 93010 ELECTROCARDIOGRAM REPORT: CPT | Performed by: INTERNAL MEDICINE

## 2024-02-20 PROCEDURE — 1123F ACP DISCUSS/DSCN MKR DOCD: CPT | Performed by: FAMILY MEDICINE

## 2024-02-20 PROCEDURE — 3075F SYST BP GE 130 - 139MM HG: CPT | Performed by: FAMILY MEDICINE

## 2024-02-20 PROCEDURE — 3078F DIAST BP <80 MM HG: CPT | Performed by: FAMILY MEDICINE

## 2024-02-20 PROCEDURE — 99214 OFFICE O/P EST MOD 30 MIN: CPT | Performed by: FAMILY MEDICINE

## 2024-02-20 NOTE — PROGRESS NOTES
[facility]     Date of Visit:  2024  Patient Name: Cameron Washington   Patient :  1950     CHIEF COMPLAINT/HPI:     Cameron Washington is a 73 y.o. male who presents today for an general visit to be evaluated for the following condition(s):  Chief Complaint   Patient presents with    Post ER     Patient  is   here  for  Post ER  for Vertigo  was seen  at  [facility]  24 -went  taken  by  EMS  he was  vomiting - on going issue with the vertigo  since       ER report was reviewed.  Patient had acute onset of vertigo over the weekend after he was finished with work.  He had acute vertigo nausea and vomiting he was evaluated in the emergency room CT of the brain and CTA of the brain were both negative laboratory studies were negative except for slightly low potassium 3.2.  Probably due to vomiting.  This will be repeated.  He wants to go back to work on   REVIEW OF SYSTEM      Review of Systems    Vertigo and nausea have completely resolved.  Patient feels just a little congestion in the frontal sinus area no sinusitis was noted on CT of the sinuses visualized during pregnancy  REVIEWED INFORMATION      Allergies   Allergen Reactions    Latex     Alcohol     Dust Mite Extract     Cephalexin Rash       Current Outpatient Medications   Medication Sig Dispense Refill    ondansetron (ZOFRAN-ODT) 4 MG disintegrating tablet Take 1 tablet by mouth 3 times daily as needed for Nausea or Vomiting 21 tablet 0    meclizine (ANTIVERT) 25 MG tablet Take 1 tablet by mouth 3 times daily as needed for Dizziness 30 tablet 0    doxycycline hyclate (VIBRA-TABS) 100 MG tablet Take 1 tablet by mouth 2 times daily for 10 days 20 tablet 0    atorvastatin (LIPITOR) 80 MG tablet Take 1 tablet by mouth daily      valsartan (DIOVAN) 160 MG tablet Take 1 tablet by mouth daily      fluticasone (FLONASE) 50 MCG/ACT nasal spray 2 sprays by Each Nostril route daily 16 g 0    tadalafil (CIALIS) 5 MG tablet

## 2024-02-26 ENCOUNTER — TELEPHONE (OUTPATIENT)
Age: 74
End: 2024-02-26

## 2024-02-26 NOTE — TELEPHONE ENCOUNTER
Patient is requesting a phone call with Dr FARRAR, he states that he needs to discuss his recent eopisodes of vertigo and possibly having to take some time off of work, I explained to him that you were seeing patients today and couldn't call him until possibly this evening.  Patient understood

## 2024-02-26 NOTE — TELEPHONE ENCOUNTER
Spoke to  patient  and  he  needs  a note  to  be off  work  from  02/23/24 and  RTW on 03/13/24

## 2024-02-26 NOTE — TELEPHONE ENCOUNTER
Is call patient, we can keep him off work because of his vertigo.  What days is he thinking about ?  And we can issue appropriate off work slip

## 2024-02-27 NOTE — TELEPHONE ENCOUNTER
FMLA fax received from [employer]  Spoke with patient. Off work due to vertigo. Start date is 2/16/24 and will return 3/13/24    Form given to Dr FARRAR to complete and sign

## 2024-03-01 DIAGNOSIS — M17.0 PRIMARY OSTEOARTHRITIS OF BOTH KNEES: ICD-10-CM

## 2024-03-01 DIAGNOSIS — M51.9 LUMBAR DISC DISEASE: Primary | ICD-10-CM

## 2024-03-01 DIAGNOSIS — I71.21 ANEURYSM OF ASCENDING AORTA WITHOUT RUPTURE (HCC): ICD-10-CM

## 2024-03-01 DIAGNOSIS — L03.116 CELLULITIS OF LEFT LEG: ICD-10-CM

## 2024-03-01 DIAGNOSIS — I77.810 ASCENDING AORTA DILATATION (HCC): ICD-10-CM

## 2024-03-01 DIAGNOSIS — K57.92 DIVERTICULITIS: ICD-10-CM

## 2024-03-01 DIAGNOSIS — J32.0 CHRONIC MAXILLARY SINUSITIS: ICD-10-CM

## 2024-03-01 DIAGNOSIS — R35.0 FREQUENCY OF URINATION: ICD-10-CM
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
## 2024-03-04 ENCOUNTER — HOSPITAL ENCOUNTER (OUTPATIENT)
Age: 74
Setting detail: SPECIMEN
Discharge: HOME | End: 2024-03-04

## 2024-03-04 ENCOUNTER — OFFICE VISIT (OUTPATIENT)
Age: 74
End: 2024-03-04
Payer: COMMERCIAL

## 2024-03-04 ENCOUNTER — HOSPITAL ENCOUNTER (OUTPATIENT)
Age: 74
Setting detail: THERAPIES SERIES
Discharge: HOME | End: 2024-03-04
Attending: FAMILY MEDICINE
Payer: COMMERCIAL

## 2024-03-04 VITALS
BODY MASS INDEX: 33.81 KG/M2 | TEMPERATURE: 97.8 F | RESPIRATION RATE: 14 BRPM | OXYGEN SATURATION: 98 % | WEIGHT: 235.6 LBS | HEART RATE: 64 BPM | DIASTOLIC BLOOD PRESSURE: 80 MMHG | SYSTOLIC BLOOD PRESSURE: 138 MMHG

## 2024-03-04 DIAGNOSIS — I10 ESSENTIAL HYPERTENSION: ICD-10-CM

## 2024-03-04 DIAGNOSIS — I10 ESSENTIAL HYPERTENSION: Primary | ICD-10-CM

## 2024-03-04 LAB
ANION GAP SERPL CALCULATED.3IONS-SCNC: 14 MMOL/L (ref 9–17)
BUN SERPL-MCNC: 17 MG/DL (ref 8–23)
CALCIUM SERPL-MCNC: 8.9 MG/DL (ref 8.6–10.4)
CHLORIDE SERPL-SCNC: 103 MMOL/L (ref 98–107)
CO2 SERPL-SCNC: 22 MMOL/L (ref 20–31)
CREAT SERPL-MCNC: 0.7 MG/DL (ref 0.7–1.2)
GFR SERPL CREATININE-BSD FRML MDRD: >60 ML/MIN/1.73M2
GLUCOSE SERPL-MCNC: 94 MG/DL (ref 70–99)
POTASSIUM SERPL-SCNC: 4 MMOL/L (ref 3.7–5.3)
SODIUM SERPL-SCNC: 139 MMOL/L (ref 135–144)

## 2024-03-04 PROCEDURE — 1123F ACP DISCUSS/DSCN MKR DOCD: CPT | Performed by: FAMILY MEDICINE

## 2024-03-04 PROCEDURE — 3079F DIAST BP 80-89 MM HG: CPT | Performed by: FAMILY MEDICINE

## 2024-03-04 PROCEDURE — 99213 OFFICE O/P EST LOW 20 MIN: CPT | Performed by: FAMILY MEDICINE

## 2024-03-04 PROCEDURE — 3075F SYST BP GE 130 - 139MM HG: CPT | Performed by: FAMILY MEDICINE

## 2024-03-04 PROCEDURE — 95992 CANALITH REPOSITIONING PROC: CPT

## 2024-03-04 PROCEDURE — 97161 PT EVAL LOW COMPLEX 20 MIN: CPT

## 2024-03-04 NOTE — PROGRESS NOTES
[facility]     Date of Visit:  3/4/2024  Patient Name: Cameron Washington   Patient :  1950     CHIEF COMPLAINT/HPI:     Cameron Washington is a 73 y.o. male who presents today for an general visit to be evaluated for the following condition(s):  Chief Complaint   Patient presents with    Dizziness     Patient is  here for  recheck on  Vertigo  going to   PT  today      Vertigo is slowly improving.  He still has a little lightheadedness with head motion.  No nausea no vomiting.  Needs a new lab slip.  He will start vestibular rehab today.  Patient is done with antibiotics for his left otitis media.  He is using Flonase at the present time not much in the way of sinus congestion nor postnasal drip  REVIEW OF SYSTEM      Review of Systems  Transient lightheadedness as mentioned above no fever no sweats no chills patient complains of some fullness in the left ear    REVIEWED INFORMATION      Allergies   Allergen Reactions    Latex     Alcohol     Dust Mite Extract     Cephalexin Rash       Current Outpatient Medications   Medication Sig Dispense Refill    ondansetron (ZOFRAN-ODT) 4 MG disintegrating tablet Take 1 tablet by mouth 3 times daily as needed for Nausea or Vomiting 21 tablet 0    atorvastatin (LIPITOR) 80 MG tablet Take 1 tablet by mouth daily      valsartan (DIOVAN) 160 MG tablet Take 1 tablet by mouth daily      fluticasone (FLONASE) 50 MCG/ACT nasal spray 2 sprays by Each Nostril route daily 16 g 0    tadalafil (CIALIS) 5 MG tablet TAKE 1 TABLET BY MOUTH ONCE DAILY AS NEEDED 90 tablet 0    celecoxib (CELEBREX) 100 MG capsule TAKE 1 CAPSULE TWICE A DAY WITH FOOD 180 capsule 3    aspirin 81 MG tablet Take 1 tablet by mouth daily       No current facility-administered medications for this visit.        Patient Active Problem List   Diagnosis    Coronary artery disease involving native coronary artery of native heart with angina pectoris (HCC)    Essential hypertension    Mixed hyperlipidemia

## 2024-03-04 NOTE — PATIENT INSTRUCTIONS
You are now able to schedule your own appointments by using your [employer] dale or you can always call the office to schedule at [phone].

## 2024-03-04 NOTE — CONSULTS
[] [facility]  Outpatient Rehabilitation &  Therapy  [address]  [phone]  F: [phone] [] [facility]  Outpatient Rehabilitation &  Therapy  [address]   [address]  P: [phone]  F: [phone] [] [facility]  Outpatient Rehabilitation &  Therapy  [address]  P: [phone]  F: [phone] [] [facility]  Outpatient Rehabilitation &  Therapy  [address]  P: [phone]  F: [phone] [] [facility]  Outpatient Rehabilitation &  Therapy  [address]   [address]   P: [phone]  F: [phone]  [x] [facility]  Outpatient Rehabilitation &  Therapy  [address]   P: [phone]  F: [phone] [] [facility]  Outpatient Rehabilitation &  Therapy  [address]  P: [phone]  F: [phone] [] [facility]  Outpatient Rehabilitation &  Therapy  [address]  [address]  P: [phone]  F: [phone] [] [facility]  Outpatient Rehabilitation &  Therapy  [address]  P: [phone]  F: [phone]      Physical Therapy Vestibular Rehab Evaluation    Date:  3/4/2024  Patient: Cameron Washington  : 1950  MRN: [id number]  Physician: Lve Leigh MD   Insurance: [employer] PPO: BMN, 20% coinsurance  Medical Diagnosis: R42  Rehab Codes: H81.12, R26.89  Onset date: 2024  Next 's appt.: 3/26/2024    Subjective:   CC: R ear feels plugged. Had mild infection in L ear. Vertigo/spinning, nausea, vomiting. Has difficulty with looking up, getting out of bed quick. Bumps into walls at times  HPI: (onset date) was ill before xmas, has had episodes of dizziness since at work and went to ER. Dr Leigh found liquid behind his L eardrum. He reports significant hearing loss over last year R ear. Tripped and fell at work    Comorbidities:   [] Obesity []

## 2024-03-11 ENCOUNTER — TELEPHONE (OUTPATIENT)
Age: 74
End: 2024-03-11

## 2024-03-11 NOTE — TELEPHONE ENCOUNTER
Patient is requesting a return to work note to return on 3/13/24. He was off due to vertigo. Please call when ready and he will  [phone]

## 2024-03-12 ENCOUNTER — TELEPHONE (OUTPATIENT)
Age: 74
End: 2024-03-12

## 2024-03-12 NOTE — TELEPHONE ENCOUNTER
Patient was here 3/4, Needed work note, Note Attached to form. Patient now states he was given Return to work forms that need filled out. Forms put in business basket. Will  when ready [phone]

## 2024-03-12 NOTE — TELEPHONE ENCOUNTER
Patient advised, he said he did have appt with his eye Dr today to \"rule things out\" they said they do not notice anything wrong with his eyes.

## 2024-03-13 ENCOUNTER — HOSPITAL ENCOUNTER (OUTPATIENT)
Age: 74
Setting detail: THERAPIES SERIES
Discharge: HOME | End: 2024-03-13
Attending: FAMILY MEDICINE
Payer: COMMERCIAL

## 2024-03-13 PROCEDURE — 95992 CANALITH REPOSITIONING PROC: CPT

## 2024-03-15 NOTE — TELEPHONE ENCOUNTER
Dr FARRAR completed form.  Left voice message for patient advising ready for .  Placed in binder at

## 2024-03-18 RX ORDER — TADALAFIL 5 MG/1
TABLET ORAL
Qty: 90 TABLET | Refills: 0 | Status: SHIPPED | OUTPATIENT
Start: 2024-03-18

## 2024-03-18 NOTE — TELEPHONE ENCOUNTER
Cameron Washington is calling to request a refill on the following medication(s):    Medication Request:  Requested Prescriptions     Pending Prescriptions Disp Refills    tadalafil (CIALIS) 5 MG tablet [Pharmacy Med Name: Tadalafil 5 MG Oral Tablet] 90 tablet 0     Sig: TAKE 1 TABLET BY MOUTH ONCE DAILY AS NEEDED       Last Visit Date (If Applicable):  Visit date not found    Next Visit Date:    Visit date not found

## 2024-04-09 ENCOUNTER — OFFICE VISIT (OUTPATIENT)
Age: 74
End: 2024-04-09
Payer: COMMERCIAL

## 2024-04-09 VITALS
DIASTOLIC BLOOD PRESSURE: 80 MMHG | TEMPERATURE: 98.9 F | RESPIRATION RATE: 14 BRPM | HEART RATE: 50 BPM | OXYGEN SATURATION: 98 % | WEIGHT: 232.2 LBS | SYSTOLIC BLOOD PRESSURE: 130 MMHG | BODY MASS INDEX: 33.32 KG/M2

## 2024-04-09 DIAGNOSIS — H81.01 MENIERE DISEASE, RIGHT: Primary | ICD-10-CM

## 2024-04-09 DIAGNOSIS — R42 VERTIGO: ICD-10-CM

## 2024-04-09 DIAGNOSIS — R53.83 OTHER FATIGUE: ICD-10-CM

## 2024-04-09 PROCEDURE — 3075F SYST BP GE 130 - 139MM HG: CPT | Performed by: FAMILY MEDICINE

## 2024-04-09 PROCEDURE — 99214 OFFICE O/P EST MOD 30 MIN: CPT | Performed by: FAMILY MEDICINE

## 2024-04-09 PROCEDURE — 3079F DIAST BP 80-89 MM HG: CPT | Performed by: FAMILY MEDICINE

## 2024-04-09 PROCEDURE — 1123F ACP DISCUSS/DSCN MKR DOCD: CPT | Performed by: FAMILY MEDICINE

## 2024-04-09 RX ORDER — PREDNISONE 10 MG/1
10 TABLET ORAL 2 TIMES DAILY
Qty: 10 TABLET | Refills: 0 | Status: SHIPPED | OUTPATIENT
Start: 2024-04-09 | End: 2024-04-19

## 2024-04-09 RX ORDER — POTASSIUM CHLORIDE 750 MG/1
10 TABLET, EXTENDED RELEASE ORAL DAILY
Qty: 30 TABLET | Refills: 2 | Status: SHIPPED | OUTPATIENT
Start: 2024-04-09

## 2024-04-09 RX ORDER — HYDROCHLOROTHIAZIDE 25 MG/1
25 TABLET ORAL EVERY MORNING
Qty: 30 TABLET | Refills: 2 | Status: SHIPPED | OUTPATIENT
Start: 2024-04-09

## 2024-04-10 ENCOUNTER — HOSPITAL ENCOUNTER (OUTPATIENT)
Age: 74
Setting detail: SPECIMEN
Discharge: HOME | End: 2024-04-10

## 2024-04-10 DIAGNOSIS — R42 VERTIGO: ICD-10-CM

## 2024-04-10 DIAGNOSIS — H81.01 MENIERE DISEASE, RIGHT: ICD-10-CM

## 2024-04-10 LAB
ALBUMIN SERPL-MCNC: 4.3 G/DL (ref 3.5–5.2)
ALBUMIN/GLOB SERPL: 2 {RATIO} (ref 1–2.5)
ALP SERPL-CCNC: 58 U/L (ref 40–129)
ALT SERPL-CCNC: 29 U/L (ref 10–50)
ANION GAP SERPL CALCULATED.3IONS-SCNC: 12 MMOL/L (ref 9–16)
AST SERPL-CCNC: 24 U/L (ref 10–50)
BILIRUB DIRECT SERPL-MCNC: 0.3 MG/DL (ref 0–0.3)
BILIRUB INDIRECT SERPL-MCNC: 0.9 MG/DL (ref 0–1)
BILIRUB SERPL-MCNC: 1.2 MG/DL (ref 0–1.2)
BUN SERPL-MCNC: 16 MG/DL (ref 8–23)
CALCIUM SERPL-MCNC: 9.3 MG/DL (ref 8.6–10.4)
CHLORIDE SERPL-SCNC: 103 MMOL/L (ref 98–107)
CO2 SERPL-SCNC: 26 MMOL/L (ref 20–31)
CREAT SERPL-MCNC: 0.9 MG/DL (ref 0.7–1.2)
ERYTHROCYTE [DISTWIDTH] IN BLOOD BY AUTOMATED COUNT: 13.1 % (ref 11.8–14.4)
GFR SERPL CREATININE-BSD FRML MDRD: 86 ML/MIN/1.73M2
GLUCOSE SERPL-MCNC: 100 MG/DL (ref 74–99)
HCT VFR BLD AUTO: 47.5 % (ref 40.7–50.3)
HGB BLD-MCNC: 16.1 G/DL (ref 13–17)
MCH RBC QN AUTO: 30.7 PG (ref 25.2–33.5)
MCHC RBC AUTO-ENTMCNC: 33.9 G/DL (ref 28.4–34.8)
MCV RBC AUTO: 90.5 FL (ref 82.6–102.9)
NRBC BLD-RTO: 0 PER 100 WBC
PLATELET # BLD AUTO: 265 K/UL (ref 138–453)
PMV BLD AUTO: 9.4 FL (ref 8.1–13.5)
POTASSIUM SERPL-SCNC: 3.8 MMOL/L (ref 3.7–5.3)
PROT SERPL-MCNC: 7 G/DL (ref 6.6–8.7)
RBC # BLD AUTO: 5.25 M/UL (ref 4.21–5.77)
SODIUM SERPL-SCNC: 141 MMOL/L (ref 136–145)
TSH SERPL DL<=0.05 MIU/L-ACNC: 2.26 UIU/ML (ref 0.27–4.2)
WBC OTHER # BLD: 8.3 K/UL (ref 3.5–11.3)

## 2024-04-12 ENCOUNTER — HOSPITAL ENCOUNTER (OUTPATIENT)
Age: 74
Setting detail: SPECIMEN
Discharge: HOME | End: 2024-04-12

## 2024-04-12 DIAGNOSIS — R42 VERTIGO: ICD-10-CM

## 2024-04-12 DIAGNOSIS — H81.01 MENIERE DISEASE, RIGHT: ICD-10-CM

## 2024-04-12 LAB
BILIRUB UR QL STRIP: NEGATIVE
CLARITY UR: CLEAR
COLOR UR: YELLOW
COMMENT: NORMAL
GLUCOSE UR STRIP-MCNC: NEGATIVE MG/DL
HGB UR QL STRIP.AUTO: NEGATIVE
KETONES UR STRIP-MCNC: NEGATIVE MG/DL
LEUKOCYTE ESTERASE UR QL STRIP: NEGATIVE
NITRITE UR QL STRIP: NEGATIVE
PH UR STRIP: 5.5 [PH] (ref 5–8)
PROT UR STRIP-MCNC: NEGATIVE MG/DL
SP GR UR STRIP: 1.02 (ref 1–1.03)
UROBILINOGEN UR STRIP-ACNC: NORMAL EU/DL (ref 0–1)

## 2024-04-16 ENCOUNTER — TELEPHONE (OUTPATIENT)
Age: 74
End: 2024-04-16

## 2024-04-16 NOTE — TELEPHONE ENCOUNTER
Received fax forms from [employer].  Patient was seen 4/09/24 was advised to stop working.    Forms given to Dr FARRAR to complete

## 2024-04-17 ENCOUNTER — HOSPITAL ENCOUNTER (OUTPATIENT)
Dept: MRI IMAGING | Age: 74
Discharge: HOME | End: 2024-04-19
Attending: FAMILY MEDICINE
Payer: COMMERCIAL

## 2024-04-17 DIAGNOSIS — R42 VERTIGO: ICD-10-CM

## 2024-04-17 DIAGNOSIS — H81.01 MENIERE DISEASE, RIGHT: ICD-10-CM

## 2024-04-17 PROCEDURE — 6360000004 HC RX CONTRAST MEDICATION: Performed by: FAMILY MEDICINE

## 2024-04-17 PROCEDURE — 70553 MRI BRAIN STEM W/O & W/DYE: CPT

## 2024-04-17 PROCEDURE — A9579 GAD-BASE MR CONTRAST NOS,1ML: HCPCS | Performed by: FAMILY MEDICINE

## 2024-04-17 RX ADMIN — GADOTERIDOL 20 ML: 279.3 INJECTION, SOLUTION INTRAVENOUS at 07:59

## 2024-04-18 ENCOUNTER — TELEPHONE (OUTPATIENT)
Age: 74
End: 2024-04-18

## 2024-04-18 RX ORDER — MECLIZINE HYDROCHLORIDE 25 MG/1
25 TABLET ORAL 3 TIMES DAILY PRN
Qty: 15 TABLET | Refills: 1 | Status: SHIPPED | OUTPATIENT
Start: 2024-04-18 | End: 2024-04-28

## 2024-04-18 NOTE — TELEPHONE ENCOUNTER
Patient states that he had a MRI on his brain and today he has been very dizzy and lightheaded from the test. He states that as he was eating something he turned his head and got a dizzy spell again. States that his ENT apt. Is next week and he will see you after that.

## 2024-04-24 ENCOUNTER — TELEPHONE (OUTPATIENT)
Age: 74
End: 2024-04-24

## 2024-04-24 NOTE — TELEPHONE ENCOUNTER
Patient dropped off [employer] application form that needs filled out. LOV-4-9-2024, NOV 4-. Would like call when ready, can  on day of appt

## 2024-04-29 ENCOUNTER — OFFICE VISIT (OUTPATIENT)
Age: 74
End: 2024-04-29
Payer: COMMERCIAL

## 2024-04-29 VITALS
HEART RATE: 72 BPM | OXYGEN SATURATION: 94 % | WEIGHT: 232.4 LBS | DIASTOLIC BLOOD PRESSURE: 80 MMHG | HEIGHT: 70 IN | SYSTOLIC BLOOD PRESSURE: 136 MMHG | BODY MASS INDEX: 33.27 KG/M2

## 2024-04-29 DIAGNOSIS — I10 ESSENTIAL HYPERTENSION: Primary | ICD-10-CM

## 2024-04-29 DIAGNOSIS — H81.01 MENIERE DISEASE, RIGHT: ICD-10-CM

## 2024-04-29 PROCEDURE — 3075F SYST BP GE 130 - 139MM HG: CPT | Performed by: FAMILY MEDICINE

## 2024-04-29 PROCEDURE — 99213 OFFICE O/P EST LOW 20 MIN: CPT | Performed by: FAMILY MEDICINE

## 2024-04-29 PROCEDURE — 3079F DIAST BP 80-89 MM HG: CPT | Performed by: FAMILY MEDICINE

## 2024-04-29 PROCEDURE — 1123F ACP DISCUSS/DSCN MKR DOCD: CPT | Performed by: FAMILY MEDICINE
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
## 2024-04-30 ENCOUNTER — TELEPHONE (OUTPATIENT)
Age: 74
End: 2024-04-30

## 2024-04-30 NOTE — TELEPHONE ENCOUNTER
Paperwork from [employer] is with Dr FARRAR to complete and sign.    Patient is off work pending upcoming apt on 6/18/24    Once completed fax both forms  to   [employer]  [phone]  Claim # [id number]

## 2024-06-05 ENCOUNTER — TELEPHONE (OUTPATIENT)
Age: 74
End: 2024-06-05

## 2024-06-05 NOTE — TELEPHONE ENCOUNTER
Pt has been getting dizzy  suddenly  sometimes the room spins  and also gotten sick. 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 Notify pt

## 2024-06-10 ENCOUNTER — OFFICE VISIT (OUTPATIENT)
Age: 74
End: 2024-06-10
Payer: COMMERCIAL

## 2024-06-10 VITALS
DIASTOLIC BLOOD PRESSURE: 68 MMHG | TEMPERATURE: 98 F | WEIGHT: 237.8 LBS | HEART RATE: 52 BPM | RESPIRATION RATE: 14 BRPM | SYSTOLIC BLOOD PRESSURE: 128 MMHG | OXYGEN SATURATION: 98 % | BODY MASS INDEX: 34.12 KG/M2

## 2024-06-10 DIAGNOSIS — R06.02 SHORTNESS OF BREATH: ICD-10-CM

## 2024-06-10 DIAGNOSIS — R00.1 BRADYCARDIA: ICD-10-CM

## 2024-06-10 DIAGNOSIS — H81.13 BENIGN PAROXYSMAL POSITIONAL VERTIGO DUE TO BILATERAL VESTIBULAR DISORDER: Primary | ICD-10-CM

## 2024-06-10 PROCEDURE — 99213 OFFICE O/P EST LOW 20 MIN: CPT | Performed by: FAMILY MEDICINE

## 2024-06-10 PROCEDURE — 1123F ACP DISCUSS/DSCN MKR DOCD: CPT | Performed by: FAMILY MEDICINE

## 2024-06-10 PROCEDURE — 3074F SYST BP LT 130 MM HG: CPT | Performed by: FAMILY MEDICINE

## 2024-06-10 PROCEDURE — 3078F DIAST BP <80 MM HG: CPT | Performed by: FAMILY MEDICINE

## 2024-06-10 NOTE — PROGRESS NOTES
[facility]  [facility] MEDICINE  [address]  [place] [address]     Date of Visit:  6/10/2024  Patient Name: Cameron Washington   Patient :  1950     CHIEF COMPLAINT/HPI:     Chief Complaint   Patient presents with    Dizziness     Patient is  here for  dizziness - patient  is  to  go back  to  work  next  week  but  his  dizziness is  back         HPI      Cameron Washington, 73 y.o. presents today regarding dizziness.     He's been struggling with vertigo starting before . Over the winter he developed bronchitis and had a few episodes of dizziness with the infection. 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 He felt dizzy while driving two days later. He's had 6-7 bouts of dizziness over the past couple of weeks including an episode this morning. He reports significant hearing loss in his right ear.  He experiences extremely elevated blood pressure readings when episodes occur and recalls seeing 200/112. He feels lightheaded and has sinus pressure over his forehead. He denies palpitations or tachycardia. Denies chest pain, however he's noticed increase in shortness of breath. He reports waking up at 3 am gagging and eventually vomited. He's been going to vestibular therapy with his most recent session being last Tuesday. He takes Meclizine as needed on days when he's not going to physical therapy with benefit. He does not drink coffee. He's cut back on caffeine and drinks 1/2 mountain dew and 1/2 lemonade. He does not consume alcohol or smoke. He follows with the eye doctor routinely.     He is eating ok. 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     He had an MRI of his brain 2024 which showed, \"Atrophy and chronic microvascular disease without acute intracranial  abnormality. Unremarkable pre and post-contrast MRI of the internal auditory canals.\" He had a CTA of his head and neck 2024

## 2024-06-11 ENCOUNTER — TELEPHONE (OUTPATIENT)
Age: 74
End: 2024-06-11

## 2024-06-11 NOTE — TELEPHONE ENCOUNTER
Received form form [employer]    Spoke with patient. He would like for us to hold off completing as he needs to see ENT. He also sees Dr FARRAR  next week.    Will keep form and update more after we hear from patient

## 2024-06-18 ENCOUNTER — HOSPITAL ENCOUNTER (OUTPATIENT)
Age: 74
Discharge: HOME | End: 2024-06-20
Attending: FAMILY MEDICINE
Payer: COMMERCIAL

## 2024-06-18 VITALS — HEIGHT: 70 IN | BODY MASS INDEX: 33.93 KG/M2 | WEIGHT: 237 LBS

## 2024-06-18 DIAGNOSIS — R00.1 BRADYCARDIA: ICD-10-CM

## 2024-06-18 DIAGNOSIS — R06.02 SHORTNESS OF BREATH: ICD-10-CM

## 2024-06-18 LAB
ECHO AO ASC DIAM: 4.5 CM
ECHO AO ASCENDING AORTA INDEX: 2.01 CM/M2
ECHO AO ROOT DIAM: 4.3 CM
ECHO AO ROOT INDEX: 1.92 CM/M2
ECHO AV AREA PEAK VELOCITY: 3 CM2
ECHO AV AREA VTI: 3.1 CM2
ECHO AV AREA/BSA PEAK VELOCITY: 1.3 CM2/M2
ECHO AV AREA/BSA VTI: 1.4 CM2/M2
ECHO AV MEAN GRADIENT: 7 MMHG
ECHO AV MEAN VELOCITY: 1.3 M/S
ECHO AV PEAK GRADIENT: 12 MMHG
ECHO AV PEAK VELOCITY: 1.8 M/S
ECHO AV VELOCITY RATIO: 0.61
ECHO AV VTI: 42.3 CM
ECHO BSA: 2.3 M2
ECHO BSA: 2.3 M2
ECHO EST RA PRESSURE: 3 MMHG
ECHO IVC EXP: 1.4 CM
ECHO IVC INSP: 0.6 CM
ECHO LA AREA 2C: 24.5 CM2
ECHO LA AREA 4C: 25.1 CM2
ECHO LA DIAMETER INDEX: 1.88 CM/M2
ECHO LA DIAMETER: 4.2 CM
ECHO LA MAJOR AXIS: 6.3 CM
ECHO LA MINOR AXIS: 6.1 CM
ECHO LA TO AORTIC ROOT RATIO: 0.98
ECHO LA VOL BP: 81 ML (ref 18–58)
ECHO LA VOL MOD A2C: 82 ML (ref 18–58)
ECHO LA VOL MOD A4C: 79 ML (ref 18–58)
ECHO LA VOL/BSA BIPLANE: 36 ML/M2 (ref 16–34)
ECHO LA VOLUME INDEX MOD A2C: 37 ML/M2 (ref 16–34)
ECHO LA VOLUME INDEX MOD A4C: 35 ML/M2 (ref 16–34)
ECHO LV E' LATERAL VELOCITY: 9 CM/S
ECHO LV E' SEPTAL VELOCITY: 5 CM/S
ECHO LV FRACTIONAL SHORTENING: 33 % (ref 28–44)
ECHO LV INTERNAL DIMENSION DIASTOLE INDEX: 2.01 CM/M2
ECHO LV INTERNAL DIMENSION DIASTOLIC: 4.5 CM (ref 4.2–5.9)
ECHO LV INTERNAL DIMENSION SYSTOLIC INDEX: 1.34 CM/M2
ECHO LV INTERNAL DIMENSION SYSTOLIC: 3 CM
ECHO LV IVSD: 1.3 CM (ref 0.6–1)
ECHO LV MASS 2D: 222.6 G (ref 88–224)
ECHO LV MASS INDEX 2D: 99.4 G/M2 (ref 49–115)
ECHO LV POSTERIOR WALL DIASTOLIC: 1.3 CM (ref 0.6–1)
ECHO LV RELATIVE WALL THICKNESS RATIO: 0.58
ECHO LVOT AREA: 4.9 CM2
ECHO LVOT AV VTI INDEX: 0.64
ECHO LVOT DIAM: 2.5 CM
ECHO LVOT MEAN GRADIENT: 3 MMHG
ECHO LVOT PEAK GRADIENT: 4 MMHG
ECHO LVOT PEAK VELOCITY: 1.1 M/S
ECHO LVOT STROKE VOLUME INDEX: 59.1 ML/M2
ECHO LVOT SV: 132.5 ML
ECHO LVOT VTI: 27 CM
ECHO MV A VELOCITY: 0.8 M/S
ECHO MV E DECELERATION TIME (DT): 272 MS
ECHO MV E VELOCITY: 0.83 M/S
ECHO MV E/A RATIO: 1.04
ECHO MV E/E' LATERAL: 9.22
ECHO MV E/E' RATIO (AVERAGED): 12.91
ECHO MV E/E' SEPTAL: 16.6
ECHO RIGHT VENTRICULAR SYSTOLIC PRESSURE (RVSP): 21 MMHG
ECHO RV BASAL DIMENSION: 3.8 CM
ECHO RV FREE WALL PEAK S': 14 CM/S
ECHO TV REGURGITANT MAX VELOCITY: 2.14 M/S
ECHO TV REGURGITANT PEAK GRADIENT: 18 MMHG

## 2024-06-18 PROCEDURE — 93225 XTRNL ECG REC<48 HRS REC: CPT

## 2024-06-18 PROCEDURE — 93306 TTE W/DOPPLER COMPLETE: CPT

## 2024-06-18 PROCEDURE — 93306 TTE W/DOPPLER COMPLETE: CPT | Performed by: INTERNAL MEDICINE

## 2024-06-24 LAB — ECHO BSA: 2.3 M2

## 2024-06-25 ENCOUNTER — TELEPHONE (OUTPATIENT)
Age: 74
End: 2024-06-25

## 2024-06-25 NOTE — TELEPHONE ENCOUNTER
Patient called to say that he still wants to wait for us to complete [employer] form.    He will contact us when he is ready for Dr FARRAR to complete

## 2024-06-25 NOTE — TELEPHONE ENCOUNTER
Patient called requesting results of echo--advised you are not in until tomorrow he said that is okay     Please call patient---echo shows normal emptying capacity of the heart there was some mild age-related thickening of the valve but no significant leakage or stenosis.  Size of the heart was normal however aorta just above the heart was high normal in diameter which means that we should repeat echocardiogram in a year.  Basically no significant abnormalities were found on echo

## 2024-06-26 NOTE — TELEPHONE ENCOUNTER
Patient called back and was given message. He is scheduled to come in on 07/02/24 so he can be cleared to go back to work

## 2024-07-01 ENCOUNTER — HOSPITAL ENCOUNTER (OUTPATIENT)
Dept: CT IMAGING | Age: 74
Discharge: HOME | End: 2024-07-03
Attending: FAMILY MEDICINE
Payer: COMMERCIAL

## 2024-07-01 DIAGNOSIS — I77.810 AORTIC ROOT DILATION (HCC): ICD-10-CM

## 2024-07-01 LAB — CREAT BLD-MCNC: 1.1 MG/DL (ref 0.6–1.4)

## 2024-07-01 PROCEDURE — 82565 ASSAY OF CREATININE: CPT

## 2024-07-01 PROCEDURE — 71275 CT ANGIOGRAPHY CHEST: CPT

## 2024-07-01 PROCEDURE — 2580000003 HC RX 258: Performed by: FAMILY MEDICINE

## 2024-07-01 PROCEDURE — 6360000004 HC RX CONTRAST MEDICATION: Performed by: FAMILY MEDICINE

## 2024-07-01 RX ORDER — HYDROCHLOROTHIAZIDE 25 MG/1
25 TABLET ORAL EVERY MORNING
Qty: 30 TABLET | Refills: 5 | Status: SHIPPED | OUTPATIENT
Start: 2024-07-01

## 2024-07-01 RX ORDER — SODIUM CHLORIDE 0.9 % (FLUSH) 0.9 %
10 SYRINGE (ML) INJECTION PRN
Status: DISCONTINUED | OUTPATIENT
Start: 2024-07-01 | End: 2024-07-04 | Stop reason: HOSPADM

## 2024-07-01 RX ORDER — 0.9 % SODIUM CHLORIDE 0.9 %
80 INTRAVENOUS SOLUTION INTRAVENOUS ONCE
Status: COMPLETED | OUTPATIENT
Start: 2024-07-01 | End: 2024-07-01

## 2024-07-01 RX ADMIN — IOPAMIDOL 100 ML: 755 INJECTION, SOLUTION INTRAVENOUS at 10:02

## 2024-07-01 RX ADMIN — SODIUM CHLORIDE 80 ML: 9 INJECTION, SOLUTION INTRAVENOUS at 10:02

## 2024-07-01 RX ADMIN — SODIUM CHLORIDE, PRESERVATIVE FREE 10 ML: 5 INJECTION INTRAVENOUS at 10:02

## 2024-07-01 NOTE — TELEPHONE ENCOUNTER
Cameron Washington is calling to request a refill on the following medication(s):    Medication Request:  Requested Prescriptions     Pending Prescriptions Disp Refills    hydroCHLOROthiazide (HYDRODIURIL) 25 MG tablet [Pharmacy Med Name: HYDROCHLOROTHIAZIDE 25 MG TAB] 30 tablet 2     Sig: take 1 tablet by mouth every morning       Last Visit Date (If Applicable):  4/29/2024    Next Visit Date:    7/2/2024

## 2024-07-02 ENCOUNTER — OFFICE VISIT (OUTPATIENT)
Age: 74
End: 2024-07-02
Payer: COMMERCIAL

## 2024-07-02 ENCOUNTER — TELEPHONE (OUTPATIENT)
Age: 74
End: 2024-07-02

## 2024-07-02 VITALS
DIASTOLIC BLOOD PRESSURE: 60 MMHG | HEART RATE: 48 BPM | WEIGHT: 242.8 LBS | TEMPERATURE: 97.6 F | BODY MASS INDEX: 34.84 KG/M2 | SYSTOLIC BLOOD PRESSURE: 112 MMHG | OXYGEN SATURATION: 97 % | RESPIRATION RATE: 14 BRPM

## 2024-07-02 DIAGNOSIS — R00.1 BRADYCARDIA BY ELECTROCARDIOGRAM: ICD-10-CM

## 2024-07-02 DIAGNOSIS — I10 ESSENTIAL HYPERTENSION: ICD-10-CM

## 2024-07-02 DIAGNOSIS — G89.29 CHRONIC RIGHT SHOULDER PAIN: ICD-10-CM

## 2024-07-02 DIAGNOSIS — M25.511 CHRONIC RIGHT SHOULDER PAIN: ICD-10-CM

## 2024-07-02 DIAGNOSIS — R42 VERTIGO: ICD-10-CM

## 2024-07-02 DIAGNOSIS — H81.13 BENIGN PAROXYSMAL POSITIONAL VERTIGO DUE TO BILATERAL VESTIBULAR DISORDER: Primary | ICD-10-CM

## 2024-07-02 DIAGNOSIS — R53.83 OTHER FATIGUE: ICD-10-CM

## 2024-07-02 DIAGNOSIS — I77.810 AORTIC ROOT DILATION (HCC): ICD-10-CM

## 2024-07-02 PROCEDURE — 1123F ACP DISCUSS/DSCN MKR DOCD: CPT | Performed by: FAMILY MEDICINE

## 2024-07-02 PROCEDURE — 3074F SYST BP LT 130 MM HG: CPT | Performed by: FAMILY MEDICINE

## 2024-07-02 PROCEDURE — 99214 OFFICE O/P EST MOD 30 MIN: CPT | Performed by: FAMILY MEDICINE

## 2024-07-02 PROCEDURE — 3078F DIAST BP <80 MM HG: CPT | Performed by: FAMILY MEDICINE

## 2024-07-02 RX ORDER — MECLIZINE HYDROCHLORIDE 25 MG/1
12.5 TABLET ORAL 3 TIMES DAILY PRN
COMMUNITY
Start: 2024-06-08

## 2024-07-02 RX ORDER — CELECOXIB 100 MG/1
200 CAPSULE ORAL DAILY
COMMUNITY
Start: 2024-06-22

## 2024-07-02 NOTE — PROGRESS NOTES
no murmur.  No peripheral edema    ASSESSMENT/PLAN     1. Benign paroxysmal positional vertigo due to bilateral vestibular disorder  2. Aortic root dilation (HCC)  3. Essential hypertension  4. Other fatigue  5. Vertigo  6. Bradycardia by electrocardiogram  -     Juan Rosenbaum MD, Cardiology, [place]  7. Chronic right shoulder pain  -     Riccardo Combs MD, Orthopaedic Surgery, [facility]       Patient will remain off work until August 5.  This all depends on cardiology workup he will see cardiologist in the near future documents for work will be complete also refer to orthopedic surgeon regarding right shoulder arthropathy    Return in about 3 weeks (around 7/23/2024).    COMMUNICATION:       Electronically signed by Lev Leigh MD on 7/2/2024 at 12:54 PM    Portion of this note were dictated using Dragon speech recognition software. It has been reviewed for accuracy, but may contain grammatical and clerical errors.

## 2024-07-02 NOTE — TELEPHONE ENCOUNTER
Patient had apt with Dr FARRAR today.      He has been put off work until Aug 5 pending 7/25 apt    Patient asking for forms to be completed.    There is another task open from today. Will continue documentation on that new task regarding forms.

## 2024-07-02 NOTE — TELEPHONE ENCOUNTER
Patient was in today.  He will be off work tentatively until August 5--I do not know if additional documents have to be sent to his insurance

## 2024-07-02 NOTE — PATIENT INSTRUCTIONS
Cameron    Thank you for choosing [facility].  We know you have options when it comes to your healthcare; we appreciate that you chose us. Our goal is to provide exceptional  service and world class care to every patient.  You will be receiving a survey via email or text message asking for your feedback.  Please take a few minutes to share your thoughts about your recent visit. Your comments help us understand what we do well and ways we can improve.  Thank you in advance for your valuable feedback.      Dr. EDSON Villatoro MA

## 2024-07-02 NOTE — TELEPHONE ENCOUNTER
Spoke with patient. He would like forms completed    [facility] -fax# [phone]    [facility] and Return to work FAX# [phone]  All with Dr FARRAR to complete and sign

## 2024-07-03 ENCOUNTER — TELEPHONE (OUTPATIENT)
Age: 74
End: 2024-07-03

## 2024-07-08 RX ORDER — TADALAFIL 5 MG/1
TABLET ORAL
Qty: 90 TABLET | Refills: 0 | Status: SHIPPED | OUTPATIENT
Start: 2024-07-08

## 2024-07-08 NOTE — TELEPHONE ENCOUNTER
Physical Therapy Daily Progress Note      Patient: Faby Le   : 1947  Diagnosis/ICD-10 Code:  Cervicogenic headache [R51]  Referring practitioner: Maggi Horn MD  Date of Initial Visit: Type: THERAPY  Noted: 3/11/2020  Today's Date: 3/19/2020  Patient seen for 3 sessions         Faby Le reports:   Feeling less pain.  Compliant with HEP.     Objective   See Exercise, Manual, and Modality Logs for complete treatment.       Assessment/Plan     Tolerates manual therapy well.  Patient to continue with I HEP due to pandemic.      Progress per Plan of Care           Manual Therapy:    15     mins  26170;  Therapeutic Exercise:         mins  17763;     Neuromuscular Mohan:        mins  53587;    Therapeutic Activity:          mins  27841;     Gait Training:           mins  36632;     Ultrasound:          mins  74172;    Electrical Stimulation:         mins  20318 ( );  Dry Needling          mins self-pay    Timed Treatment:   15   mins   Total Treatment:     25   mins    Octavio Hutchins PT  Physical Therapist                         Cameron Washington is calling to request a refill on the following medication(s):    Medication Request:  Requested Prescriptions     Pending Prescriptions Disp Refills    tadalafil (CIALIS) 5 MG tablet [Pharmacy Med Name: Tadalafil 5 MG Oral Tablet] 90 tablet 0     Sig: TAKE 1 TABLET BY MOUTH ONCE DAILY AS NEEDED       Last Visit Date (If Applicable):  7/2/2024    Next Visit Date:    7/25/2024

## 2024-07-08 NOTE — TELEPHONE ENCOUNTER
Pt called and to schedule an appt. Pt stated he feels he needs to be seen sooner then writer can offer. Pt stated he has already had both a ct and an echo. Please contact pt.

## 2024-07-08 NOTE — TELEPHONE ENCOUNTER
Called patient back and have scheduled 7/16/24 @ 3:45p with Dr. Villanueva. I explained how office is supposed to get a nurse practitioner and if there are any appts that open or we adjust later on we will call patient to schedule sooner.    Patient voiced understanding.

## 2024-07-09 ENCOUNTER — OFFICE VISIT (OUTPATIENT)
Age: 74
End: 2024-07-09
Payer: COMMERCIAL

## 2024-07-09 VITALS — BODY MASS INDEX: 34.65 KG/M2 | HEIGHT: 70 IN | WEIGHT: 242 LBS

## 2024-07-09 DIAGNOSIS — M25.511 RIGHT SHOULDER PAIN, UNSPECIFIED CHRONICITY: Primary | ICD-10-CM

## 2024-07-09 DIAGNOSIS — M79.601 RIGHT ARM PAIN: ICD-10-CM

## 2024-07-09 PROCEDURE — 1123F ACP DISCUSS/DSCN MKR DOCD: CPT | Performed by: ORTHOPAEDIC SURGERY

## 2024-07-09 PROCEDURE — 99204 OFFICE O/P NEW MOD 45 MIN: CPT | Performed by: ORTHOPAEDIC SURGERY

## 2024-07-09 NOTE — PROGRESS NOTES
Right arm pain          This is a 73 y.o. male with right shoulder pain. We discussed different causes of his right shoulder pain. On imaging and physical exam he has findings consistent with a rotator cuff tear. At this time, pt will get MRI of the right shoulder to evaluate for rotator cuff tear. He will follow up in our clinic after undergoing the MRI.        Review of Systems   Constitutional: Negative for fever, chills, sweats.   Neurological: Negative numbness, or weakness.   Integumentary: Negative for rash, itching, laceration, or abrasion.   Musculoskeletal: Positive for Shoulder Pain (R shoulder)           Past History:    Current Outpatient Medications:     tadalafil (CIALIS) 5 MG tablet, TAKE 1 TABLET BY MOUTH ONCE DAILY AS NEEDED, Disp: 90 tablet, Rfl: 0    meclizine (ANTIVERT) 25 MG tablet, Take 0.5 tablets by mouth 3 times daily as needed, Disp: , Rfl:     celecoxib (CELEBREX) 100 MG capsule, Take 2 capsules by mouth daily, Disp: , Rfl:     hydroCHLOROthiazide (HYDRODIURIL) 25 MG tablet, take 1 tablet by mouth every morning, Disp: 30 tablet, Rfl: 5    potassium chloride (KLOR-CON M) 10 MEQ extended release tablet, Take 1 tablet by mouth daily, Disp: 30 tablet, Rfl: 2    valsartan (DIOVAN) 160 MG tablet, Take 1 tablet by mouth daily, Disp: , Rfl:     fluticasone (FLONASE) 50 MCG/ACT nasal spray, 2 sprays by Each Nostril route daily, Disp: 16 g, Rfl: 0    aspirin 81 MG tablet, Take 1 tablet by mouth daily, Disp: , Rfl:   Allergies   Allergen Reactions    Latex     Alcohol     Dust Mite Extract     Cephalexin Rash     Social History     Socioeconomic History    Marital status:      Spouse name: Not on file    Number of children: Not on file    Years of education: Not on file    Highest education level: Not on file   Occupational History    Not on file   Tobacco Use    Smoking status: Never    Smokeless tobacco: Never   Vaping Use    Vaping Use: Never used   Substance and Sexual Activity    Alcohol

## 2024-07-16 ENCOUNTER — OFFICE VISIT (OUTPATIENT)
Age: 74
End: 2024-07-16

## 2024-07-16 VITALS
WEIGHT: 242 LBS | SYSTOLIC BLOOD PRESSURE: 145 MMHG | DIASTOLIC BLOOD PRESSURE: 78 MMHG | HEART RATE: 63 BPM | OXYGEN SATURATION: 97 % | BODY MASS INDEX: 34.72 KG/M2

## 2024-07-16 DIAGNOSIS — R00.1 BRADYCARDIA BY ELECTROCARDIOGRAM: Primary | ICD-10-CM

## 2024-07-16 DIAGNOSIS — R06.02 SHORTNESS OF BREATH: ICD-10-CM

## 2024-07-16 RX ORDER — AMLODIPINE BESYLATE 5 MG/1
5 TABLET ORAL DAILY
Qty: 30 TABLET | Refills: 3 | Status: SHIPPED | OUTPATIENT
Start: 2024-07-16

## 2024-07-16 NOTE — PROGRESS NOTES
Cardiology Office Consultation           CC: Patient is here to establish cardiac care for bradycardia and light headedness    HPI  Cameron Washington is here to establish cardiac care.  He reports intermittent episodes of dizziness described as room spinning associated with nausea primarily on turning head suddenly. He does have underlying Meniere's disease. He was noted to be bradycardic during last visit with dr Leigh and underwent holter monitor which showed sinus bradycardia predominantly, no secondary arrhythmias, pacs with one short run of pat was seen. Patient denies any chest pain or angina. He does report dyspnea on exertion.       Past Medical:  Past Medical History:   Diagnosis Date    Arteriosclerotic heart disease     Cellulitis of right upper limb     Resolved    Elevated prostate specific antigen (PSA)     Resolved    Essential hypertension     H/O colonoscopy     Fiberoptic    Intervertebral disc disorders with radiculopathy, lumbar region     Lesion of ulnar nerve, right upper limb     Resolved    Mixed hyperlipidemia     Personal history of other diseases of the respiratory system     Resolved    Unspecified fracture of shaft of humerus, right arm, initial encounter for closed fracture     Resolved       Past Surgical:  Past Surgical History:   Procedure Laterality Date    BACK SURGERY      COLONOSCOPY      Fiberoptic    ELBOW SURGERY      KNEE ARTHROSCOPY Right     VENTRAL HERNIA REPAIR         Family History:  No family history on file.    Social History:  Social History     Tobacco Use    Smoking status: Never    Smokeless tobacco: Never   Vaping Use    Vaping Use: Never used   Substance Use Topics    Alcohol use: Never    Drug use: Never        REVIEW OF SYSTEMS:    Constitutional: there has been no unanticipated weight loss. There's been No change in energy level, No change in activity level.     Eyes: No visual changes or diplopia. No scleral icterus.  ENT: No Headaches, hearing loss or

## 2024-07-17 ENCOUNTER — HOSPITAL ENCOUNTER (OUTPATIENT)
Dept: MRI IMAGING | Age: 74
Discharge: HOME | End: 2024-07-19
Attending: ORTHOPAEDIC SURGERY
Payer: COMMERCIAL

## 2024-07-17 DIAGNOSIS — M25.511 RIGHT SHOULDER PAIN, UNSPECIFIED CHRONICITY: ICD-10-CM

## 2024-07-17 PROCEDURE — 73221 MRI JOINT UPR EXTREM W/O DYE: CPT

## 2024-07-25 ENCOUNTER — OFFICE VISIT (OUTPATIENT)
Age: 74
End: 2024-07-25
Payer: COMMERCIAL

## 2024-07-25 VITALS
HEART RATE: 58 BPM | OXYGEN SATURATION: 94 % | SYSTOLIC BLOOD PRESSURE: 140 MMHG | DIASTOLIC BLOOD PRESSURE: 80 MMHG | HEIGHT: 70 IN | WEIGHT: 242.2 LBS | BODY MASS INDEX: 34.67 KG/M2

## 2024-07-25 DIAGNOSIS — R00.1 BRADYCARDIA BY ELECTROCARDIOGRAM: ICD-10-CM

## 2024-07-25 DIAGNOSIS — R06.02 SHORTNESS OF BREATH: ICD-10-CM

## 2024-07-25 DIAGNOSIS — I77.810 AORTIC ROOT DILATION (HCC): Primary | ICD-10-CM

## 2024-07-25 DIAGNOSIS — R42 VERTIGO: ICD-10-CM

## 2024-07-25 DIAGNOSIS — H81.01 MENIERE DISEASE, RIGHT: ICD-10-CM

## 2024-07-25 PROCEDURE — 3079F DIAST BP 80-89 MM HG: CPT | Performed by: FAMILY MEDICINE

## 2024-07-25 PROCEDURE — 99214 OFFICE O/P EST MOD 30 MIN: CPT | Performed by: FAMILY MEDICINE

## 2024-07-25 PROCEDURE — 1123F ACP DISCUSS/DSCN MKR DOCD: CPT | Performed by: FAMILY MEDICINE

## 2024-07-25 PROCEDURE — 3077F SYST BP >= 140 MM HG: CPT | Performed by: FAMILY MEDICINE

## 2024-07-25 NOTE — PROGRESS NOTES
Hunger Vital Sign     Worried About Running Out of Food in the Last Year: Never true     Ran Out of Food in the Last Year: Never true   Transportation Needs: Unknown (12/14/2023)    PRAPARE - Transportation     Lack of Transportation (Non-Medical): No   Housing Stability: Unknown (12/14/2023)    Housing Stability Vital Sign     Unstable Housing in the Last Year: No        PHYSICAL EXAM     BP (!) 140/80   Pulse 58   Ht 1.778 m (5' 10\")   Wt 109.9 kg (242 lb 3.2 oz)   SpO2 94%   BMI 34.75 kg/m²    Physical Exam  Alert no distress pulse approximately 58.  No nystagmus no facial asymmetry neck no masses trachea midline no lymphadenopathy.  Lungs clear.  Heart regular rhythm no murmur no extrasystoles abdomen soft nontender no peripheral edema although patient does report edema of the ankles at the end of the day he is on calcium channel blockers.  Strong pedal pulses were noted bilateral    ASSESSMENT/PLAN     1. Aortic root dilation (HCC)  2. Bradycardia by electrocardiogram  3. Shortness of breath  4. Vertigo  5. Meniere disease, right       Patient will remain off work until after stress test tentatively return to work on August 14.  This return to work date is fluid.  Stress test pending return here in 4 weeks    COMMUNICATION:       Electronically signed by Lev Leigh MD on 7/25/2024 at 9:00 AM    Portion of this note were dictated using Dragon speech recognition software. It has been reviewed for accuracy, but may contain grammatical and clerical errors.

## 2024-07-25 NOTE — PATIENT INSTRUCTIONS
Cameron    Thank you for choosing [facility].  We know you have options when it comes to your healthcare; we appreciate that you chose us. Our goal is to provide exceptional  service and world class care to every patient.  You will be receiving a survey via email or text message asking for your feedback.  Please take a few minutes to share your thoughts about your recent visit. Your comments help us understand what we do well and ways we can improve.  Thank you in advance for your valuable feedback.      Dr. EDSON Ruff

## 2024-07-30 ENCOUNTER — OFFICE VISIT (OUTPATIENT)
Age: 74
End: 2024-07-30
Payer: COMMERCIAL

## 2024-07-30 DIAGNOSIS — M75.121 NONTRAUMATIC COMPLETE TEAR OF RIGHT ROTATOR CUFF: Primary | ICD-10-CM

## 2024-07-30 PROCEDURE — 99214 OFFICE O/P EST MOD 30 MIN: CPT | Performed by: ORTHOPAEDIC SURGERY

## 2024-07-30 PROCEDURE — 1123F ACP DISCUSS/DSCN MKR DOCD: CPT | Performed by: ORTHOPAEDIC SURGERY

## 2024-07-30 NOTE — PROGRESS NOTES
discuss with him the risks of surgery including infection potential possibility of not being able to get a rotator cuff repair.  Also continued shoulder pain and stiffness.  He understood this at this point he like to go forth right shoulder arthroscopy with open subacromial decompression and rotator cuff repair with biceps tenotomy..         Review of Systems   Constitutional: Negative for fever, chills, sweats.   Neurological: Negative numbness, or weakness.   Integumentary: Negative for rash, itching, laceration, or abrasion.   Musculoskeletal: Positive for Shoulder Pain (Discuss MRI right soulder)           Past History:    Current Outpatient Medications:     amLODIPine (NORVASC) 5 MG tablet, Take 1 tablet by mouth daily, Disp: 30 tablet, Rfl: 3    tadalafil (CIALIS) 5 MG tablet, TAKE 1 TABLET BY MOUTH ONCE DAILY AS NEEDED, Disp: 90 tablet, Rfl: 0    meclizine (ANTIVERT) 25 MG tablet, Take 0.5 tablets by mouth 3 times daily as needed, Disp: , Rfl:     celecoxib (CELEBREX) 100 MG capsule, Take 2 capsules by mouth as needed, Disp: , Rfl:     potassium chloride (KLOR-CON M) 10 MEQ extended release tablet, Take 1 tablet by mouth daily, Disp: 30 tablet, Rfl: 2    valsartan (DIOVAN) 160 MG tablet, Take 1 tablet by mouth daily, Disp: , Rfl:     fluticasone (FLONASE) 50 MCG/ACT nasal spray, 2 sprays by Each Nostril route daily, Disp: 16 g, Rfl: 0    aspirin 81 MG tablet, Take 1 tablet by mouth daily, Disp: , Rfl:   Allergies   Allergen Reactions    Latex     Alcohol     Dust Mite Extract     Cephalexin Rash     Social History     Socioeconomic History    Marital status:      Spouse name: Not on file    Number of children: Not on file    Years of education: Not on file    Highest education level: Not on file   Occupational History    Not on file   Tobacco Use    Smoking status: Never    Smokeless tobacco: Never   Vaping Use    Vaping Use: Never used   Substance and Sexual Activity    Alcohol use: Never    Drug

## 2024-08-05 ENCOUNTER — TELEPHONE (OUTPATIENT)
Age: 74
End: 2024-08-05

## 2024-08-05 NOTE — TELEPHONE ENCOUNTER
Patient has a stress test on Wednesday morning. What meds should he not be taking before the test?? When should he stop his BP meds and then resume?    Please notify pt

## 2024-08-06 ENCOUNTER — TELEPHONE (OUTPATIENT)
Age: 74
End: 2024-08-06

## 2024-08-07 ENCOUNTER — HOSPITAL ENCOUNTER (OUTPATIENT)
Dept: NUCLEAR MEDICINE | Age: 74
Discharge: HOME | End: 2024-08-09
Attending: INTERNAL MEDICINE
Payer: COMMERCIAL

## 2024-08-07 ENCOUNTER — HOSPITAL ENCOUNTER (OUTPATIENT)
Age: 74
Discharge: HOME | End: 2024-08-09
Attending: INTERNAL MEDICINE
Payer: COMMERCIAL

## 2024-08-07 ENCOUNTER — TELEPHONE (OUTPATIENT)
Age: 74
End: 2024-08-07

## 2024-08-07 VITALS — DIASTOLIC BLOOD PRESSURE: 80 MMHG | SYSTOLIC BLOOD PRESSURE: 155 MMHG | HEART RATE: 60 BPM

## 2024-08-07 DIAGNOSIS — R06.02 SHORTNESS OF BREATH: ICD-10-CM

## 2024-08-07 LAB
NUC STRESS EJECTION FRACTION: 53 %
STRESS BASELINE DIAS BP: 96 MMHG
STRESS BASELINE HR: 45 BPM
STRESS BASELINE SYS BP: 161 MMHG
STRESS ESTIMATED WORKLOAD: 1 METS
STRESS PEAK DIAS BP: 96 MMHG
STRESS PEAK SYS BP: 161 MMHG
STRESS PERCENT HR ACHIEVED: 44 %
STRESS POST PEAK HR: 65 BPM
STRESS RATE PRESSURE PRODUCT: NORMAL BPM*MMHG
STRESS TARGET HR: 147 BPM
TID: 1.03

## 2024-08-07 PROCEDURE — 93017 CV STRESS TEST TRACING ONLY: CPT

## 2024-08-07 PROCEDURE — 78452 HT MUSCLE IMAGE SPECT MULT: CPT

## 2024-08-07 PROCEDURE — 2580000003 HC RX 258: Performed by: INTERNAL MEDICINE

## 2024-08-07 PROCEDURE — 3430000000 HC RX DIAGNOSTIC RADIOPHARMACEUTICAL: Performed by: INTERNAL MEDICINE

## 2024-08-07 PROCEDURE — 6360000002 HC RX W HCPCS: Performed by: INTERNAL MEDICINE

## 2024-08-07 PROCEDURE — 93018 CV STRESS TEST I&R ONLY: CPT | Performed by: INTERNAL MEDICINE

## 2024-08-07 PROCEDURE — A9500 TC99M SESTAMIBI: HCPCS | Performed by: INTERNAL MEDICINE

## 2024-08-07 RX ORDER — SODIUM CHLORIDE 0.9 % (FLUSH) 0.9 %
5-40 SYRINGE (ML) INJECTION PRN
Status: DISCONTINUED | OUTPATIENT
Start: 2024-08-07 | End: 2024-08-07

## 2024-08-07 RX ORDER — TETRAKIS(2-METHOXYISOBUTYLISOCYANIDE)COPPER(I) TETRAFLUOROBORATE 1 MG/ML
30 INJECTION, POWDER, LYOPHILIZED, FOR SOLUTION INTRAVENOUS
Status: COMPLETED | OUTPATIENT
Start: 2024-08-07 | End: 2024-08-07

## 2024-08-07 RX ORDER — NITROGLYCERIN 0.4 MG/1
0.4 TABLET SUBLINGUAL EVERY 5 MIN PRN
Status: DISCONTINUED | OUTPATIENT
Start: 2024-08-07 | End: 2024-08-07

## 2024-08-07 RX ORDER — SODIUM CHLORIDE 9 MG/ML
500 INJECTION, SOLUTION INTRAVENOUS CONTINUOUS PRN
Status: DISCONTINUED | OUTPATIENT
Start: 2024-08-07 | End: 2024-08-07

## 2024-08-07 RX ORDER — AMINOPHYLLINE 25 MG/ML
50 INJECTION, SOLUTION INTRAVENOUS PRN
Status: DISCONTINUED | OUTPATIENT
Start: 2024-08-07 | End: 2024-08-07

## 2024-08-07 RX ORDER — SODIUM CHLORIDE 0.9 % (FLUSH) 0.9 %
10 SYRINGE (ML) INJECTION
Status: COMPLETED | OUTPATIENT
Start: 2024-08-07 | End: 2024-08-07

## 2024-08-07 RX ORDER — TETRAKIS(2-METHOXYISOBUTYLISOCYANIDE)COPPER(I) TETRAFLUOROBORATE 1 MG/ML
10 INJECTION, POWDER, LYOPHILIZED, FOR SOLUTION INTRAVENOUS
Status: COMPLETED | OUTPATIENT
Start: 2024-08-07 | End: 2024-08-07

## 2024-08-07 RX ORDER — REGADENOSON 0.08 MG/ML
0.4 INJECTION, SOLUTION INTRAVENOUS
Status: COMPLETED | OUTPATIENT
Start: 2024-08-07 | End: 2024-08-07

## 2024-08-07 RX ORDER — METOPROLOL TARTRATE 1 MG/ML
5 INJECTION, SOLUTION INTRAVENOUS EVERY 5 MIN PRN
Status: DISCONTINUED | OUTPATIENT
Start: 2024-08-07 | End: 2024-08-07

## 2024-08-07 RX ORDER — ATROPINE SULFATE 0.1 MG/ML
0.5 INJECTION INTRAVENOUS EVERY 5 MIN PRN
Status: DISCONTINUED | OUTPATIENT
Start: 2024-08-07 | End: 2024-08-07

## 2024-08-07 RX ORDER — ALBUTEROL SULFATE 90 UG/1
2 AEROSOL, METERED RESPIRATORY (INHALATION) PRN
Status: DISCONTINUED | OUTPATIENT
Start: 2024-08-07 | End: 2024-08-07

## 2024-08-07 RX ADMIN — TETRAKIS(2-METHOXYISOBUTYLISOCYANIDE)COPPER(I) TETRAFLUOROBORATE 13.9 MILLICURIE: 1 INJECTION, POWDER, LYOPHILIZED, FOR SOLUTION INTRAVENOUS at 08:45

## 2024-08-07 RX ADMIN — SODIUM CHLORIDE, PRESERVATIVE FREE 10 ML: 5 INJECTION INTRAVENOUS at 09:39

## 2024-08-07 RX ADMIN — SODIUM CHLORIDE, PRESERVATIVE FREE 10 ML: 5 INJECTION INTRAVENOUS at 08:45

## 2024-08-07 RX ADMIN — TETRAKIS(2-METHOXYISOBUTYLISOCYANIDE)COPPER(I) TETRAFLUOROBORATE 37 MILLICURIE: 1 INJECTION, POWDER, LYOPHILIZED, FOR SOLUTION INTRAVENOUS at 08:59

## 2024-08-07 RX ADMIN — REGADENOSON 0.4 MG: 0.08 INJECTION, SOLUTION INTRAVENOUS at 09:39

## 2024-08-07 RX ADMIN — SODIUM CHLORIDE, PRESERVATIVE FREE 10 ML: 5 INJECTION INTRAVENOUS at 09:00

## 2024-08-07 NOTE — TELEPHONE ENCOUNTER
Patient called to say he needs a new updated form faxed back to [employer], extension for short term disability: July 25 - Aug 14, 2024.  He said [employer] is supposed to be faxing over new form today or tomorrow morning.  Please complete and fax back to [employer] (NOT [facility])

## 2024-08-07 NOTE — PROGRESS NOTES
Patient present for Lexiscan stress test. Educated on procedure. All questions/concerns addressed. Allergies and medication reviewed. Patient prepped for procedure. Stress test will be supervised by YESSI Quijano.

## 2024-08-07 NOTE — TELEPHONE ENCOUNTER
Cameron said he is having R shoulder surgery with Dr Basilio on September 5th (rotator cuff full tear).

## 2024-08-09 NOTE — TELEPHONE ENCOUNTER
[facility] form with Dr FARRAR to complete      After completed, scan into chart and FAX to [employer] [facility] [phone] with   Claim# [id number]   and notify patient

## 2024-08-09 NOTE — TELEPHONE ENCOUNTER
Form faxed to [facility]  Sent MyChart msg to patient advising this has been completed

## 2024-08-12 ENCOUNTER — TELEPHONE (OUTPATIENT)
Age: 74
End: 2024-08-12

## 2024-08-12 NOTE — TELEPHONE ENCOUNTER
Possible bicep tendon tear. Wife said patient has some bruising and eitan muscle.     Patient was messing on the roof. LT shoulder/arm.     Advised patient to call dr palomo office and see if they can get him in. He is seeing dr palomo next month for a rotar cuff repair.     Advised him that if he doesn't get in to call us back.

## 2024-08-13 ENCOUNTER — TELEPHONE (OUTPATIENT)
Age: 74
End: 2024-08-13

## 2024-08-13 ENCOUNTER — OFFICE VISIT (OUTPATIENT)
Age: 74
End: 2024-08-13
Payer: COMMERCIAL

## 2024-08-13 VITALS — BODY MASS INDEX: 34.65 KG/M2 | WEIGHT: 242 LBS | HEIGHT: 70 IN

## 2024-08-13 DIAGNOSIS — M25.511 RIGHT SHOULDER PAIN, UNSPECIFIED CHRONICITY: Primary | ICD-10-CM

## 2024-08-13 PROCEDURE — 99214 OFFICE O/P EST MOD 30 MIN: CPT

## 2024-08-13 PROCEDURE — 1123F ACP DISCUSS/DSCN MKR DOCD: CPT

## 2024-08-13 NOTE — TELEPHONE ENCOUNTER
Patient was calling on his results of Stress test. I let him know I would have Dr. Villanueva look at it and call patient back with results.

## 2024-08-13 NOTE — PROGRESS NOTES
Future     Standing Expiration Date:   8/13/2025     Order Specific Question:   Reason for exam:     Answer:   eval for cuff tear     Order Specific Question:   What is the sedation requirement?     Answer:   None    Amb External Referral To Physical Therapy     Referral Priority:   Routine     Referral Type:   Eval and Treat     Referral Reason:   Specialty Services Required     Requested Specialty:   Physical Therapy     Number of Visits Requested:   1       Assessment and Plan:  1. Right shoulder pain, unspecified chronicity          This is a pleasant 73 y.o. male with left proximal biceps tendon rupture as well as possible rotator cuff tear of the left shoulder.  I did inform the patient that he has 2 attachments the biceps tendon and this will not impact his overall functionality.  I did inform him that his biceps muscle will scar in and he can have a cramping sensation into his bicep from his muscle scarring in.  I also would like to order an MRI of the left shoulder to evaluate for potential rotator cuff tear.  I also will get him involved in some physical therapy to work on overall strength and range of motion.  As far as pain goes, he can take Tylenol and ibuprofen as needed for pain.  We will have him follow-up after his MRI of the left shoulder.  The patient demonstrates understanding and is agreeable to plan.        Review of Systems   Constitutional: Negative for fever, chills, sweats.   Neurological: Negative numbness, or weakness.   Integumentary: Negative for rash, itching, laceration, or abrasion.   Musculoskeletal: Positive for Arm Injury (L arm (bicep possible))           Past History:    Current Outpatient Medications:     amLODIPine (NORVASC) 5 MG tablet, Take 1 tablet by mouth daily, Disp: 30 tablet, Rfl: 3    tadalafil (CIALIS) 5 MG tablet, TAKE 1 TABLET BY MOUTH ONCE DAILY AS NEEDED, Disp: 90 tablet, Rfl: 0    meclizine (ANTIVERT) 25 MG tablet, Take 0.5 tablets by mouth 3 times daily as

## 2024-08-15 ENCOUNTER — TELEPHONE (OUTPATIENT)
Age: 74
End: 2024-08-15

## 2024-08-15 NOTE — TELEPHONE ENCOUNTER
Patient has been scheduled for sx on  9/5. I called today and spoke to him/her to confirm date/time/location of sx and reminded him/her to get PAT done.  I asked that he/she come to the office to sign consent and  instructions. We also scheduled the post op appointment.

## 2024-08-19 ENCOUNTER — TELEPHONE (OUTPATIENT)
Dept: PRIMARY CARE CLINIC | Age: 74
End: 2024-08-19

## 2024-08-19 NOTE — TELEPHONE ENCOUNTER
Patient stopped into office wondering what lab work needs to be completed prior to surgery with Dr. Bryson and asking when this needs to be completed by, and if he needs to fast for the labs.

## 2024-08-20 DIAGNOSIS — Z01.818 PRE-OP EXAM: Primary | ICD-10-CM

## 2024-08-21 ENCOUNTER — HOSPITAL ENCOUNTER (OUTPATIENT)
Age: 74
Setting detail: SPECIMEN
Discharge: HOME | End: 2024-08-21

## 2024-08-21 ENCOUNTER — HOSPITAL ENCOUNTER (OUTPATIENT)
Dept: MRI IMAGING | Age: 74
Discharge: HOME | End: 2024-08-23
Payer: COMMERCIAL

## 2024-08-21 DIAGNOSIS — M25.511 RIGHT SHOULDER PAIN, UNSPECIFIED CHRONICITY: ICD-10-CM

## 2024-08-21 DIAGNOSIS — Z01.818 PRE-OP EXAM: ICD-10-CM

## 2024-08-21 LAB
ANION GAP SERPL CALCULATED.3IONS-SCNC: 12 MMOL/L (ref 9–16)
BASOPHILS # BLD: <0.03 K/UL (ref 0–0.2)
BASOPHILS NFR BLD: 0 % (ref 0–2)
BUN SERPL-MCNC: 10 MG/DL (ref 8–23)
CALCIUM SERPL-MCNC: 9.1 MG/DL (ref 8.6–10.4)
CHLORIDE SERPL-SCNC: 103 MMOL/L (ref 98–107)
CO2 SERPL-SCNC: 25 MMOL/L (ref 20–31)
CREAT SERPL-MCNC: 0.9 MG/DL (ref 0.7–1.2)
EOSINOPHIL # BLD: 0.15 K/UL (ref 0–0.44)
EOSINOPHILS RELATIVE PERCENT: 2 % (ref 1–4)
ERYTHROCYTE [DISTWIDTH] IN BLOOD BY AUTOMATED COUNT: 13.2 % (ref 11.8–14.4)
GFR, ESTIMATED: >90 ML/MIN/1.73M2
GLUCOSE SERPL-MCNC: 101 MG/DL (ref 74–99)
HCT VFR BLD AUTO: 42.9 % (ref 40.7–50.3)
HGB BLD-MCNC: 14.6 G/DL (ref 13–17)
IMM GRANULOCYTES # BLD AUTO: <0.03 K/UL (ref 0–0.3)
IMM GRANULOCYTES NFR BLD: 0 %
LYMPHOCYTES NFR BLD: 2.15 K/UL (ref 1.1–3.7)
LYMPHOCYTES RELATIVE PERCENT: 29 % (ref 24–43)
MCH RBC QN AUTO: 31.2 PG (ref 25.2–33.5)
MCHC RBC AUTO-ENTMCNC: 34 G/DL (ref 28.4–34.8)
MCV RBC AUTO: 91.7 FL (ref 82.6–102.9)
MONOCYTES NFR BLD: 0.66 K/UL (ref 0.1–1.2)
MONOCYTES NFR BLD: 9 % (ref 3–12)
NEUTROPHILS NFR BLD: 60 % (ref 36–65)
NEUTS SEG NFR BLD: 4.5 K/UL (ref 1.5–8.1)
NRBC BLD-RTO: 0 PER 100 WBC
PLATELET # BLD AUTO: 249 K/UL (ref 138–453)
PMV BLD AUTO: 10 FL (ref 8.1–13.5)
POTASSIUM SERPL-SCNC: 4.5 MMOL/L (ref 3.7–5.3)
RBC # BLD AUTO: 4.68 M/UL (ref 4.21–5.77)
SODIUM SERPL-SCNC: 140 MMOL/L (ref 136–145)
WBC OTHER # BLD: 7.5 K/UL (ref 3.5–11.3)

## 2024-08-21 PROCEDURE — 73221 MRI JOINT UPR EXTREM W/O DYE: CPT

## 2024-08-23 ENCOUNTER — TELEPHONE (OUTPATIENT)
Age: 74
End: 2024-08-23

## 2024-08-23 NOTE — TELEPHONE ENCOUNTER
MRI results left shoulder, per Samantha Mckeon, can be reviewed at appointment following patient's right shoulder arthroscopy procedure.

## 2024-08-28 ENCOUNTER — OFFICE VISIT (OUTPATIENT)
Age: 74
End: 2024-08-28
Payer: COMMERCIAL

## 2024-08-28 VITALS
HEART RATE: 58 BPM | DIASTOLIC BLOOD PRESSURE: 82 MMHG | BODY MASS INDEX: 34.93 KG/M2 | HEIGHT: 70 IN | SYSTOLIC BLOOD PRESSURE: 138 MMHG | OXYGEN SATURATION: 96 % | WEIGHT: 244 LBS

## 2024-08-28 DIAGNOSIS — I77.810 ASCENDING AORTA DILATATION (HCC): ICD-10-CM

## 2024-08-28 DIAGNOSIS — I10 ESSENTIAL HYPERTENSION: ICD-10-CM

## 2024-08-28 DIAGNOSIS — I10 PRIMARY HYPERTENSION: ICD-10-CM

## 2024-08-28 DIAGNOSIS — M12.811 ROTATOR CUFF ARTHROPATHY OF RIGHT SHOULDER: Primary | ICD-10-CM

## 2024-08-28 DIAGNOSIS — H81.13 BENIGN PAROXYSMAL POSITIONAL VERTIGO DUE TO BILATERAL VESTIBULAR DISORDER: ICD-10-CM

## 2024-08-28 PROCEDURE — 3075F SYST BP GE 130 - 139MM HG: CPT | Performed by: FAMILY MEDICINE

## 2024-08-28 PROCEDURE — 3079F DIAST BP 80-89 MM HG: CPT | Performed by: FAMILY MEDICINE

## 2024-08-28 PROCEDURE — 1123F ACP DISCUSS/DSCN MKR DOCD: CPT | Performed by: FAMILY MEDICINE

## 2024-08-28 PROCEDURE — 99215 OFFICE O/P EST HI 40 MIN: CPT | Performed by: FAMILY MEDICINE

## 2024-08-28 NOTE — PATIENT INSTRUCTIONS
Cameron    Thank you for choosing [facility].  We know you have options when it comes to your healthcare; we appreciate that you chose us. Our goal is to provide exceptional  service and world class care to every patient.  You will be receiving a survey via email or text message asking for your feedback.  Please take a few minutes to share your thoughts about your recent visit. Your comments help us understand what we do well and ways we can improve.  Thank you in advance for your valuable feedback.      Dr. Leigh, Lev Machado MA

## 2024-08-28 NOTE — PROGRESS NOTES
[facility]     Date of Visit:  2024  Patient Name: Cameron Washington   Patient :  1950     CHIEF COMPLAINT/HPI:     Cameron Washington is a 73 y.o. male who presents today for an general visit to be evaluated for the following condition(s):  Chief Complaint   Patient presents with    Surgical Clearance     Patient has history of rotator cuff tear right shoulder and he is being scheduled for definitive surgery on .  He had recently had stress test that was negative for ischemic changes.  He will have follow-up with cardiology later this fall.  He does have ectasia of the ascending aorta and that will be followed with serial imaging studies his vertigo has been fairly good of late as he is not turning his head back-and-forth as he did at his place of employment        Lab Results   Component Value Date/Time     2024 11:47 AM    K 4.5 2024 11:47 AM     2024 11:47 AM    CO2 25 2024 11:47 AM    BUN 10 2024 11:47 AM    CREATININE 0.9 2024 11:47 AM    GLUCOSE 101 2024 11:47 AM    CALCIUM 9.1 2024 11:47 AM    LABGLOM >90 2024 11:47 AM    LABGLOM 86 04/10/2024 10:45 AM            Lab Results   Component Value Date    WBC 7.5 2024    HGB 14.6 2024    HCT 42.9 2024    MCV 91.7 2024     2024       Lab Results   Component Value Date    ALT 29 04/10/2024    AST 24 04/10/2024    ALKPHOS 58 04/10/2024    BILITOT 1.2 04/10/2024        Lab Results   Component Value Date    TSH 2.26 04/10/2024        REVIEW OF SYSTEM      Review of Systems  No fever no sweats no chills  Patient has no other healthcare issues.  No fever no sweats no chills. No chest pain nor shortness of breath. No nausea nor vomiting no diarrhea . No  complaints.  No edema issues.        REVIEWED INFORMATION      Allergies   Allergen Reactions    Latex     Alcohol     Dust Mite Extract     Cephalexin Rash       Current Outpatient

## 2024-08-29 NOTE — PROGRESS NOTES
DAY OF SURGERY/PROCEDURE  GUIDELINES    As a patient at the [facility], you can expect quality medical and nursing care that is centered on your individual needs. It is our goal to make your surgical experience as comfortable and excellent as possible.  ________________________________________________________________________    The following instructions are general guidelines, if any information on this sheet is different from what your doctor has instructed you to do, please follow your doctor's instructions.    Please arrive on 9/5 @ 1030 am       Enter through entrance C. Check in at registration     Upon arrival you will be taken to the pre-operative area to get ready for surgery, your family will stay in the waiting room and visit with you once you are ready for surgery. Due to special limitations please limit visitation to 1-2 members of your family at a time. When it is time for surgery your family will return to the waiting room.    Nothing to eat, drink, smoke, suck or chew after midnight (no water, gum, mints, cigarettes, cigars, pipes, snuff, chewing tobacco, etc.) or your surgery may be canceled.     Take a shower or bath on the morning of your surgery/procedure (Hibiclens if directed) Do not apply any lotions.    Brush your teeth, but do not swallow any water    IN CASE OF ILLNESS - If you have a cold or flu symptoms (high fever, runny nose, sore throat, cough, etc.) rash, nausea, vomiting, loose stools, and/or recent contact with someone who has a contagious disease (chick pox, measles, etc.) please call your doctor before coming to the surgery center    Take a small sip of water with heart, blood pressure, and/or seizure medication the morning of surgery. amlodipine    DO NOT take anticoagulants (blood thinners, aspirin or aspirin-containing products) as instructed by your physician.    Leave all jewelry at home and wear loose, comfortable clothing that is easy to put on and

## 2024-09-04 ENCOUNTER — ANESTHESIA EVENT (OUTPATIENT)
Dept: OPERATING ROOM | Age: 74
End: 2024-09-04
Payer: COMMERCIAL

## 2024-09-04 NOTE — H&P
ORTHOPEDIC PATIENT EVALUATION      HPI / Chief Complaint  Cameron Washington is a 73 y.o. male who presents for right shoulder rotator cuff tear as well as biceps tendinitis.    Past Medical History  Cameron  has a past medical history of Arteriosclerotic heart disease, Asthma, Cellulitis of right upper limb, Elevated prostate specific antigen (PSA), Essential hypertension, H/O colonoscopy, Intervertebral disc disorders with radiculopathy, lumbar region, Lesion of ulnar nerve, right upper limb, Mixed hyperlipidemia, Personal history of other diseases of the respiratory system, Prolonged emergence from general anesthesia, and Unspecified fracture of shaft of humerus, right arm, initial encounter for closed fracture.    Past Surgical History  Cameron  has a past surgical history that includes ventral hernia repair; back surgery; Elbow surgery; Knee arthroscopy (Right); and Colonoscopy.    Current Medications  No current facility-administered medications for this encounter.     Current Outpatient Medications   Medication Sig Dispense Refill    amLODIPine (NORVASC) 5 MG tablet Take 1 tablet by mouth daily 30 tablet 3    tadalafil (CIALIS) 5 MG tablet TAKE 1 TABLET BY MOUTH ONCE DAILY AS NEEDED 90 tablet 0    meclizine (ANTIVERT) 25 MG tablet Take 0.5 tablets by mouth 3 times daily as needed      potassium chloride (KLOR-CON M) 10 MEQ extended release tablet Take 1 tablet by mouth daily (Patient not taking: Reported on 8/29/2024) 30 tablet 2    valsartan (DIOVAN) 160 MG tablet Take 1 tablet by mouth daily      fluticasone (FLONASE) 50 MCG/ACT nasal spray 2 sprays by Each Nostril route daily 16 g 0    aspirin 81 MG tablet Take 1 tablet by mouth daily         Allergies  Allergies have been reviewed.  Cameron is allergic to latex, alcohol, dust mite extract, and cephalexin.    Social History  Cameron  reports that he has never smoked. He has never used smokeless tobacco. He reports that he does not drink alcohol and does not  use drugs.    Family History  Cameron's family history is not on file.      Review of Systems   History obtained from the patient.   REVIEW OF SYSTEMS:   Constitution: negative for fever, chills  Musculoskeletal: As noted in the HPI   Neurologic: As noted in the HPI    Physical Exam  There were no vitals taken for this visit.   General Appearance:  No apparent distress  Mental Status: Alert and oriented  Heart: Rate regular  Lungs: Respirations regular, no distress  Abdomen: Soft  Neurovascular: Palpable pulses        Diagnostics and Labs  Relevant diagnostic, laboratory and radiological studies have been reviewed in the Electronic Medical Record.    Assessment and Plan  Cameron Washington is a 73 y.o. old male with right shoulder rotator cuff tear as well as biceps tendinitis.  Plan for right shoulder arthroscopy with open rotator cuff repair, biceps tenotomy, and subacromial decompression.

## 2024-09-05 ENCOUNTER — ANESTHESIA (OUTPATIENT)
Dept: OPERATING ROOM | Age: 74
End: 2024-09-05
Payer: COMMERCIAL

## 2024-09-05 ENCOUNTER — HOSPITAL ENCOUNTER (OUTPATIENT)
Age: 74
Setting detail: OUTPATIENT SURGERY
Discharge: HOME | End: 2024-09-05
Attending: ORTHOPAEDIC SURGERY | Admitting: ORTHOPAEDIC SURGERY
Payer: COMMERCIAL

## 2024-09-05 VITALS
WEIGHT: 241 LBS | RESPIRATION RATE: 17 BRPM | HEART RATE: 51 BPM | OXYGEN SATURATION: 92 % | HEIGHT: 70 IN | TEMPERATURE: 97.9 F | BODY MASS INDEX: 34.5 KG/M2 | SYSTOLIC BLOOD PRESSURE: 116 MMHG | DIASTOLIC BLOOD PRESSURE: 66 MMHG

## 2024-09-05 DIAGNOSIS — G89.18 POST-OP PAIN: Primary | ICD-10-CM

## 2024-09-05 PROCEDURE — 6360000002 HC RX W HCPCS

## 2024-09-05 PROCEDURE — C1713 ANCHOR/SCREW BN/BN,TIS/BN: HCPCS | Performed by: ORTHOPAEDIC SURGERY

## 2024-09-05 PROCEDURE — 6360000002 HC RX W HCPCS: Performed by: ORTHOPAEDIC SURGERY

## 2024-09-05 PROCEDURE — 3600000014 HC SURGERY LEVEL 4 ADDTL 15MIN: Performed by: ORTHOPAEDIC SURGERY

## 2024-09-05 PROCEDURE — 64415 NJX AA&/STRD BRCH PLXS IMG: CPT | Performed by: ANESTHESIOLOGY

## 2024-09-05 PROCEDURE — L3650 SO 8 ABD RESTRAINT PRE OTS: HCPCS | Performed by: ORTHOPAEDIC SURGERY

## 2024-09-05 PROCEDURE — 3700000000 HC ANESTHESIA ATTENDED CARE: Performed by: ORTHOPAEDIC SURGERY

## 2024-09-05 PROCEDURE — 7100000010 HC PHASE II RECOVERY - FIRST 15 MIN: Performed by: ORTHOPAEDIC SURGERY

## 2024-09-05 PROCEDURE — 6370000000 HC RX 637 (ALT 250 FOR IP)

## 2024-09-05 PROCEDURE — 7100000001 HC PACU RECOVERY - ADDTL 15 MIN: Performed by: ORTHOPAEDIC SURGERY

## 2024-09-05 PROCEDURE — 2580000003 HC RX 258

## 2024-09-05 PROCEDURE — 23130 ACROMP/ACROMIONECTOMY PRTL: CPT | Performed by: ORTHOPAEDIC SURGERY

## 2024-09-05 PROCEDURE — 2500000003 HC RX 250 WO HCPCS

## 2024-09-05 PROCEDURE — 7100000000 HC PACU RECOVERY - FIRST 15 MIN: Performed by: ORTHOPAEDIC SURGERY

## 2024-09-05 PROCEDURE — 2709999900 HC NON-CHARGEABLE SUPPLY: Performed by: ORTHOPAEDIC SURGERY

## 2024-09-05 PROCEDURE — 6360000002 HC RX W HCPCS: Performed by: ANESTHESIOLOGY

## 2024-09-05 PROCEDURE — 3700000001 HC ADD 15 MINUTES (ANESTHESIA): Performed by: ORTHOPAEDIC SURGERY

## 2024-09-05 PROCEDURE — 7100000011 HC PHASE II RECOVERY - ADDTL 15 MIN: Performed by: ORTHOPAEDIC SURGERY

## 2024-09-05 PROCEDURE — 29822 SHO ARTHRS SRG LMTD DBRDMT: CPT | Performed by: ORTHOPAEDIC SURGERY

## 2024-09-05 PROCEDURE — 2580000003 HC RX 258: Performed by: ANESTHESIOLOGY

## 2024-09-05 PROCEDURE — 3600000004 HC SURGERY LEVEL 4 BASE: Performed by: ORTHOPAEDIC SURGERY

## 2024-09-05 PROCEDURE — 2580000003 HC RX 258: Performed by: ORTHOPAEDIC SURGERY

## 2024-09-05 PROCEDURE — 23412 REPAIR ROTATOR CUFF CHRONIC: CPT | Performed by: ORTHOPAEDIC SURGERY

## 2024-09-05 PROCEDURE — C9290 INJ, BUPIVACAINE LIPOSOME: HCPCS | Performed by: ANESTHESIOLOGY

## 2024-09-05 PROCEDURE — 2720000010 HC SURG SUPPLY STERILE: Performed by: ORTHOPAEDIC SURGERY

## 2024-09-05 DEVICE — ANCHOR SUTURE BIOCOMP 4.75X19.1 MM SWIVELOCK C: Type: IMPLANTABLE DEVICE | Site: SHOULDER | Status: FUNCTIONAL

## 2024-09-05 RX ORDER — SODIUM CHLORIDE 0.9 % (FLUSH) 0.9 %
5-40 SYRINGE (ML) INJECTION PRN
Status: DISCONTINUED | OUTPATIENT
Start: 2024-09-05 | End: 2024-09-05 | Stop reason: HOSPADM

## 2024-09-05 RX ORDER — SODIUM CHLORIDE 9 MG/ML
INJECTION, SOLUTION INTRAVENOUS PRN
Status: DISCONTINUED | OUTPATIENT
Start: 2024-09-05 | End: 2024-09-05 | Stop reason: HOSPADM

## 2024-09-05 RX ORDER — OXYCODONE HYDROCHLORIDE 5 MG/1
5 TABLET ORAL PRN
Status: DISCONTINUED | OUTPATIENT
Start: 2024-09-05 | End: 2024-09-05

## 2024-09-05 RX ORDER — CEFAZOLIN 2 G/1
INJECTION, POWDER, FOR SOLUTION INTRAMUSCULAR; INTRAVENOUS
Status: DISCONTINUED
Start: 2024-09-05 | End: 2024-09-05 | Stop reason: HOSPADM

## 2024-09-05 RX ORDER — IBUPROFEN 800 MG/1
800 TABLET, FILM COATED ORAL EVERY 8 HOURS PRN
Qty: 90 TABLET | Refills: 0 | Status: SHIPPED | OUTPATIENT
Start: 2024-09-05

## 2024-09-05 RX ORDER — EPINEPHRINE 1 MG/ML
INJECTION, SOLUTION INTRAMUSCULAR; SUBCUTANEOUS
Status: DISCONTINUED
Start: 2024-09-05 | End: 2024-09-05 | Stop reason: HOSPADM

## 2024-09-05 RX ORDER — OXYCODONE AND ACETAMINOPHEN 5; 325 MG/1; MG/1
1-2 TABLET ORAL EVERY 4 HOURS PRN
Qty: 40 TABLET | Refills: 0 | Status: SHIPPED | OUTPATIENT
Start: 2024-09-05 | End: 2024-09-12

## 2024-09-05 RX ORDER — ROCURONIUM BROMIDE 10 MG/ML
INJECTION, SOLUTION INTRAVENOUS PRN
Status: DISCONTINUED | OUTPATIENT
Start: 2024-09-05 | End: 2024-09-05 | Stop reason: SDUPTHER

## 2024-09-05 RX ORDER — SODIUM CHLORIDE, SODIUM LACTATE, POTASSIUM CHLORIDE, CALCIUM CHLORIDE 600; 310; 30; 20 MG/100ML; MG/100ML; MG/100ML; MG/100ML
INJECTION, SOLUTION INTRAVENOUS CONTINUOUS
Status: DISCONTINUED | OUTPATIENT
Start: 2024-09-05 | End: 2024-09-05 | Stop reason: HOSPADM

## 2024-09-05 RX ORDER — PROPOFOL 10 MG/ML
INJECTION, EMULSION INTRAVENOUS PRN
Status: DISCONTINUED | OUTPATIENT
Start: 2024-09-05 | End: 2024-09-05 | Stop reason: SDUPTHER

## 2024-09-05 RX ORDER — HYDRALAZINE HYDROCHLORIDE 20 MG/ML
10 INJECTION INTRAMUSCULAR; INTRAVENOUS
Status: DISCONTINUED | OUTPATIENT
Start: 2024-09-05 | End: 2024-09-05 | Stop reason: HOSPADM

## 2024-09-05 RX ORDER — EPHEDRINE SULFATE/0.9% NACL/PF 25 MG/5 ML
SYRINGE (ML) INTRAVENOUS PRN
Status: DISCONTINUED | OUTPATIENT
Start: 2024-09-05 | End: 2024-09-05 | Stop reason: SDUPTHER

## 2024-09-05 RX ORDER — OXYCODONE AND ACETAMINOPHEN 5; 325 MG/1; MG/1
1 TABLET ORAL
Status: COMPLETED | OUTPATIENT
Start: 2024-09-05 | End: 2024-09-05

## 2024-09-05 RX ORDER — GLYCOPYRROLATE 0.2 MG/ML
INJECTION INTRAMUSCULAR; INTRAVENOUS PRN
Status: DISCONTINUED | OUTPATIENT
Start: 2024-09-05 | End: 2024-09-05 | Stop reason: SDUPTHER

## 2024-09-05 RX ORDER — OXYCODONE HYDROCHLORIDE 5 MG/1
10 TABLET ORAL PRN
Status: DISCONTINUED | OUTPATIENT
Start: 2024-09-05 | End: 2024-09-05

## 2024-09-05 RX ORDER — LIDOCAINE HYDROCHLORIDE 10 MG/ML
INJECTION, SOLUTION INFILTRATION; PERINEURAL PRN
Status: DISCONTINUED | OUTPATIENT
Start: 2024-09-05 | End: 2024-09-05 | Stop reason: SDUPTHER

## 2024-09-05 RX ORDER — SODIUM CHLORIDE 0.9 % (FLUSH) 0.9 %
5-40 SYRINGE (ML) INJECTION EVERY 12 HOURS SCHEDULED
Status: DISCONTINUED | OUTPATIENT
Start: 2024-09-05 | End: 2024-09-05 | Stop reason: HOSPADM

## 2024-09-05 RX ORDER — SODIUM CHLORIDE 9 MG/ML
INJECTION, SOLUTION INTRAVENOUS CONTINUOUS PRN
Status: DISCONTINUED | OUTPATIENT
Start: 2024-09-05 | End: 2024-09-05 | Stop reason: SDUPTHER

## 2024-09-05 RX ORDER — MIDAZOLAM HYDROCHLORIDE 1 MG/ML
INJECTION INTRAMUSCULAR; INTRAVENOUS
Status: COMPLETED | OUTPATIENT
Start: 2024-09-05 | End: 2024-09-05

## 2024-09-05 RX ORDER — FENTANYL CITRATE 50 UG/ML
INJECTION, SOLUTION INTRAMUSCULAR; INTRAVENOUS PRN
Status: DISCONTINUED | OUTPATIENT
Start: 2024-09-05 | End: 2024-09-05 | Stop reason: SDUPTHER

## 2024-09-05 RX ORDER — ONDANSETRON 2 MG/ML
INJECTION INTRAMUSCULAR; INTRAVENOUS PRN
Status: DISCONTINUED | OUTPATIENT
Start: 2024-09-05 | End: 2024-09-05 | Stop reason: SDUPTHER

## 2024-09-05 RX ORDER — HYDRALAZINE HYDROCHLORIDE 20 MG/ML
INJECTION INTRAMUSCULAR; INTRAVENOUS PRN
Status: DISCONTINUED | OUTPATIENT
Start: 2024-09-05 | End: 2024-09-05 | Stop reason: SDUPTHER

## 2024-09-05 RX ORDER — MIDAZOLAM HYDROCHLORIDE 2 MG/2ML
2 INJECTION, SOLUTION INTRAMUSCULAR; INTRAVENOUS ONCE
Status: COMPLETED | OUTPATIENT
Start: 2024-09-05 | End: 2024-09-05

## 2024-09-05 RX ORDER — MIDAZOLAM HYDROCHLORIDE 1 MG/ML
INJECTION INTRAMUSCULAR; INTRAVENOUS
Status: COMPLETED
Start: 2024-09-05 | End: 2024-09-05

## 2024-09-05 RX ORDER — ONDANSETRON 4 MG/1
8 TABLET, FILM COATED ORAL EVERY 8 HOURS PRN
Qty: 12 TABLET | Refills: 0 | Status: SHIPPED | OUTPATIENT
Start: 2024-09-05

## 2024-09-05 RX ORDER — METOCLOPRAMIDE HYDROCHLORIDE 5 MG/ML
10 INJECTION INTRAMUSCULAR; INTRAVENOUS
Status: DISCONTINUED | OUTPATIENT
Start: 2024-09-05 | End: 2024-09-05 | Stop reason: HOSPADM

## 2024-09-05 RX ORDER — LABETALOL HYDROCHLORIDE 5 MG/ML
10 INJECTION, SOLUTION INTRAVENOUS
Status: DISCONTINUED | OUTPATIENT
Start: 2024-09-05 | End: 2024-09-05 | Stop reason: HOSPADM

## 2024-09-05 RX ORDER — DOCUSATE SODIUM 100 MG/1
100 CAPSULE, LIQUID FILLED ORAL 2 TIMES DAILY
Qty: 30 CAPSULE | Refills: 0 | Status: SHIPPED | OUTPATIENT
Start: 2024-09-05 | End: 2024-09-19

## 2024-09-05 RX ORDER — NALOXONE HYDROCHLORIDE 0.4 MG/ML
INJECTION, SOLUTION INTRAMUSCULAR; INTRAVENOUS; SUBCUTANEOUS PRN
Status: DISCONTINUED | OUTPATIENT
Start: 2024-09-05 | End: 2024-09-05 | Stop reason: HOSPADM

## 2024-09-05 RX ORDER — LIDOCAINE HYDROCHLORIDE 10 MG/ML
1 INJECTION, SOLUTION EPIDURAL; INFILTRATION; INTRACAUDAL; PERINEURAL
Status: DISCONTINUED | OUTPATIENT
Start: 2024-09-05 | End: 2024-09-05 | Stop reason: HOSPADM

## 2024-09-05 RX ORDER — CEPHALEXIN 500 MG/1
500 CAPSULE ORAL 2 TIMES DAILY
Qty: 10 CAPSULE | Refills: 0 | Status: SHIPPED | OUTPATIENT
Start: 2024-09-05 | End: 2024-09-10

## 2024-09-05 RX ORDER — OXYCODONE AND ACETAMINOPHEN 5; 325 MG/1; MG/1
TABLET ORAL
Status: COMPLETED
Start: 2024-09-05 | End: 2024-09-05

## 2024-09-05 RX ORDER — PHENYLEPHRINE HCL IN 0.9% NACL 1 MG/10 ML
SYRINGE (ML) INTRAVENOUS PRN
Status: DISCONTINUED | OUTPATIENT
Start: 2024-09-05 | End: 2024-09-05 | Stop reason: SDUPTHER

## 2024-09-05 RX ORDER — FENTANYL CITRATE 50 UG/ML
INJECTION, SOLUTION INTRAMUSCULAR; INTRAVENOUS
Status: DISCONTINUED
Start: 2024-09-05 | End: 2024-09-05 | Stop reason: WASHOUT

## 2024-09-05 RX ORDER — BUPIVACAINE HYDROCHLORIDE 5 MG/ML
INJECTION, SOLUTION EPIDURAL; INTRACAUDAL
Status: COMPLETED | OUTPATIENT
Start: 2024-09-05 | End: 2024-09-05

## 2024-09-05 RX ORDER — DIPHENHYDRAMINE HYDROCHLORIDE 50 MG/ML
12.5 INJECTION INTRAMUSCULAR; INTRAVENOUS
Status: DISCONTINUED | OUTPATIENT
Start: 2024-09-05 | End: 2024-09-05 | Stop reason: HOSPADM

## 2024-09-05 RX ORDER — MORPHINE SULFATE 2 MG/ML
1 INJECTION, SOLUTION INTRAMUSCULAR; INTRAVENOUS EVERY 5 MIN PRN
Status: DISCONTINUED | OUTPATIENT
Start: 2024-09-05 | End: 2024-09-05 | Stop reason: HOSPADM

## 2024-09-05 RX ORDER — MIDAZOLAM HYDROCHLORIDE 2 MG/2ML
2 INJECTION, SOLUTION INTRAMUSCULAR; INTRAVENOUS
Status: DISCONTINUED | OUTPATIENT
Start: 2024-09-05 | End: 2024-09-05 | Stop reason: HOSPADM

## 2024-09-05 RX ORDER — ONDANSETRON 2 MG/ML
4 INJECTION INTRAMUSCULAR; INTRAVENOUS
Status: DISCONTINUED | OUTPATIENT
Start: 2024-09-05 | End: 2024-09-05 | Stop reason: HOSPADM

## 2024-09-05 RX ORDER — DEXAMETHASONE SODIUM PHOSPHATE 10 MG/ML
INJECTION, SOLUTION INTRAMUSCULAR; INTRAVENOUS PRN
Status: DISCONTINUED | OUTPATIENT
Start: 2024-09-05 | End: 2024-09-05 | Stop reason: SDUPTHER

## 2024-09-05 RX ORDER — MEPERIDINE HYDROCHLORIDE 50 MG/ML
12.5 INJECTION INTRAMUSCULAR; INTRAVENOUS; SUBCUTANEOUS ONCE
Status: DISCONTINUED | OUTPATIENT
Start: 2024-09-05 | End: 2024-09-05 | Stop reason: HOSPADM

## 2024-09-05 RX ORDER — KETOROLAC TROMETHAMINE 30 MG/ML
INJECTION, SOLUTION INTRAMUSCULAR; INTRAVENOUS PRN
Status: DISCONTINUED | OUTPATIENT
Start: 2024-09-05 | End: 2024-09-05 | Stop reason: SDUPTHER

## 2024-09-05 RX ADMIN — PROPOFOL 50 MG: 10 INJECTION, EMULSION INTRAVENOUS at 13:08

## 2024-09-05 RX ADMIN — MIDAZOLAM HYDROCHLORIDE 2 MG: 1 INJECTION, SOLUTION INTRAMUSCULAR; INTRAVENOUS at 10:28

## 2024-09-05 RX ADMIN — Medication 100 MCG: at 12:40

## 2024-09-05 RX ADMIN — FENTANYL CITRATE 25 MCG: 50 INJECTION, SOLUTION INTRAMUSCULAR; INTRAVENOUS at 12:13

## 2024-09-05 RX ADMIN — MIDAZOLAM HYDROCHLORIDE 2 MG: 2 INJECTION, SOLUTION INTRAMUSCULAR; INTRAVENOUS at 10:28

## 2024-09-05 RX ADMIN — Medication 100 MCG: at 12:02

## 2024-09-05 RX ADMIN — SODIUM CHLORIDE, POTASSIUM CHLORIDE, SODIUM LACTATE AND CALCIUM CHLORIDE: 600; 310; 30; 20 INJECTION, SOLUTION INTRAVENOUS at 10:21

## 2024-09-05 RX ADMIN — DEXAMETHASONE SODIUM PHOSPHATE 4 MG: 10 INJECTION, SOLUTION INTRAMUSCULAR; INTRAVENOUS at 11:59

## 2024-09-05 RX ADMIN — FENTANYL CITRATE 25 MCG: 50 INJECTION, SOLUTION INTRAMUSCULAR; INTRAVENOUS at 13:12

## 2024-09-05 RX ADMIN — EPHEDRINE SULFATE 5 MG: 5 INJECTION INTRAVENOUS at 13:06

## 2024-09-05 RX ADMIN — ROCURONIUM BROMIDE 10 MG: 10 INJECTION, SOLUTION INTRAVENOUS at 13:08

## 2024-09-05 RX ADMIN — ONDANSETRON 4 MG: 2 INJECTION INTRAMUSCULAR; INTRAVENOUS at 13:30

## 2024-09-05 RX ADMIN — Medication 100 MCG: at 12:21

## 2024-09-05 RX ADMIN — EPHEDRINE SULFATE 5 MG: 5 INJECTION INTRAVENOUS at 12:46

## 2024-09-05 RX ADMIN — EPHEDRINE SULFATE 5 MG: 5 INJECTION INTRAVENOUS at 13:25

## 2024-09-05 RX ADMIN — SODIUM CHLORIDE: 9 INJECTION, SOLUTION INTRAVENOUS at 13:13

## 2024-09-05 RX ADMIN — MIDAZOLAM 2 MG: 1 INJECTION INTRAMUSCULAR; INTRAVENOUS at 10:30

## 2024-09-05 RX ADMIN — PROPOFOL 150 MG: 10 INJECTION, EMULSION INTRAVENOUS at 11:44

## 2024-09-05 RX ADMIN — BUPIVACAINE 10 ML: 13.3 INJECTION, SUSPENSION, LIPOSOMAL INFILTRATION at 10:30

## 2024-09-05 RX ADMIN — FENTANYL CITRATE 50 MCG: 50 INJECTION, SOLUTION INTRAMUSCULAR; INTRAVENOUS at 11:44

## 2024-09-05 RX ADMIN — EPHEDRINE SULFATE 5 MG: 5 INJECTION INTRAVENOUS at 12:48

## 2024-09-05 RX ADMIN — EPHEDRINE SULFATE 5 MG: 5 INJECTION INTRAVENOUS at 12:56

## 2024-09-05 RX ADMIN — Medication 0.5 MG: at 14:47

## 2024-09-05 RX ADMIN — SUGAMMADEX 100 MG: 100 INJECTION, SOLUTION INTRAVENOUS at 13:54

## 2024-09-05 RX ADMIN — HYDROMORPHONE HYDROCHLORIDE 0.5 MG: 1 INJECTION, SOLUTION INTRAMUSCULAR; INTRAVENOUS; SUBCUTANEOUS at 14:47

## 2024-09-05 RX ADMIN — ROCURONIUM BROMIDE 50 MG: 10 INJECTION, SOLUTION INTRAVENOUS at 11:44

## 2024-09-05 RX ADMIN — LIDOCAINE HYDROCHLORIDE 50 MG: 10 INJECTION, SOLUTION INFILTRATION; PERINEURAL at 11:44

## 2024-09-05 RX ADMIN — BUPIVACAINE HYDROCHLORIDE 10 ML: 5 INJECTION, SOLUTION EPIDURAL; INTRACAUDAL; PERINEURAL at 10:30

## 2024-09-05 RX ADMIN — OXYCODONE HYDROCHLORIDE AND ACETAMINOPHEN 1 TABLET: 5; 325 TABLET ORAL at 14:55

## 2024-09-05 RX ADMIN — HYDRALAZINE HYDROCHLORIDE 10 MG: 20 INJECTION, SOLUTION INTRAMUSCULAR; INTRAVENOUS at 13:49

## 2024-09-05 RX ADMIN — PROPOFOL 30 MG: 10 INJECTION, EMULSION INTRAVENOUS at 13:50

## 2024-09-05 RX ADMIN — OXYCODONE AND ACETAMINOPHEN 1 TABLET: 5; 325 TABLET ORAL at 14:55

## 2024-09-05 RX ADMIN — KETOROLAC TROMETHAMINE 15 MG: 30 INJECTION, SOLUTION INTRAMUSCULAR at 13:30

## 2024-09-05 RX ADMIN — GLYCOPYRROLATE 0.3 MG: 0.2 INJECTION INTRAMUSCULAR; INTRAVENOUS at 11:44

## 2024-09-05 ASSESSMENT — PAIN SCALES - GENERAL
PAINLEVEL_OUTOF10: 5
PAINLEVEL_OUTOF10: 7
PAINLEVEL_OUTOF10: 7
PAINLEVEL_OUTOF10: 5
PAINLEVEL_OUTOF10: 5

## 2024-09-05 ASSESSMENT — PAIN DESCRIPTION - DESCRIPTORS
DESCRIPTORS: SORE

## 2024-09-05 ASSESSMENT — PAIN DESCRIPTION - ORIENTATION
ORIENTATION: RIGHT;POSTERIOR
ORIENTATION: RIGHT;POSTERIOR
ORIENTATION: RIGHT

## 2024-09-05 ASSESSMENT — ENCOUNTER SYMPTOMS: SHORTNESS OF BREATH: 1

## 2024-09-05 ASSESSMENT — PAIN DESCRIPTION - LOCATION
LOCATION: ARM

## 2024-09-05 ASSESSMENT — PAIN - FUNCTIONAL ASSESSMENT: PAIN_FUNCTIONAL_ASSESSMENT: 0-10

## 2024-09-05 NOTE — OP NOTE
Operative Note      Patient: Cameron Washington  YOB: 1950  MRN: [id number]    Date of Procedure: 9/5/2024    Pre-Op Diagnosis Codes:      * Tear of right rotator cuff, unspecified tear extent, unspecified whether traumatic [M75.101]     * Biceps tendinitis of right shoulder [M75.21]    Post-Op Diagnosis: Right shoulder biceps tendinitis, full-thickness tear of supraspinatus superior subscap and anterior infraspinatus tendons, labral tear       Procedure(s):  Right Shoulder Arthroscopy, Rotator Cuff Repair with Biceps Tenotomy, Open Subacromial Decompression, and Labral Debridement.  3 tendon rotator cuff repair of anterior infraspinatus, supraspinatus, superior subscap    Surgeon(s):  Riccardo Basilio MD    Assistant:   Resident: Chema Wetzel MD    Anesthesia: Regional    Estimated Blood Loss (mL): less than 50     Complications: None    Specimens:   * No specimens in log *    Implants:  Implant Name Type Inv. Item Serial No.  Lot No. LRB No. Used Action   ANCHOR SUTURE BIOCOMP 4.75X19.1 MM SWIVELOCK C - [id number]  ANCHOR SUTURE BIOCOMP 4.75X19.1 MM SWIV[employer]OCK C  [employer] [id number] Right 1 Implanted         Drains: * No LDAs found *    Findings:  Infection Present At Time Of Surgery (PATOS) (choose all levels that have infection present):  No infection present  Other Findings: Above    Detailed Description of Procedure:   This is a 73-year-old male who has right shoulder pain.  We have discussed with him the risks and benefits of this procedure and he has let go ahead with this today.  Patient was brought to the operating room placed in supine position.  He received general anesthesia.  He was then placed in a beachchair position with all bony prominences well-padded and head well secured.  His right upper extremity was then prepped and draped sterile fashion.  Timeout was performed ensuring correct patient correct extremity and correct procedure.  Preoperative antibiotics were assured

## 2024-09-05 NOTE — DISCHARGE INSTRUCTIONS
Activity-keep arm in sling.  Okay to perform pendulum exercises daily.    Dressing-keep dressing clean dry and intact for 5 days.  Then okay to remove and shower and get wet.  Okay to shower and postoperative day 2, no direct water to dressing site.    Activity  You have had anesthesia today  Do not drive, operate heavy equipment, consume alcoholic beverages, or make any important decisions  for 24 hours   If you are taking pain medication: Do not drive or consume alcohol.  Take your time changing positions today. You may feel light headed or dizzy if you move too quickly.   Continue your home medications as ordered by your physician.  Diet   You can eat your normal diet when you feel well. You should start off with bland foods like chicken soup, toast, or yogurt. Then advance as tolerated.  Drink plenty of fluids (unless your doctor tells you not to). Your urine should be very lightly colored without a strong odor.

## 2024-09-05 NOTE — ANESTHESIA PROCEDURE NOTES
Peripheral Block    Patient location during procedure: pre-op  Reason for block: post-op pain management and at surgeon's request  Start time: 9/5/2024 10:30 AM  End time: 9/5/2024 10:34 AM  Staffing  Performed: anesthesiologist   Anesthesiologist: Georgie Navas MD  Performed by: Georgie Navas MD  Authorized by: Georgie Navas MD    Preanesthetic Checklist  Completed: patient identified, IV checked, site marked, risks and benefits discussed, surgical/procedural consents, equipment checked, pre-op evaluation, timeout performed, anesthesia consent given, oxygen available, monitors applied/VS acknowledged, fire risk safety assessment completed and verbalized and blood product R/B/A discussed and consented  Peripheral Block   Patient position: supine  Prep: ChloraPrep  Provider prep: mask and sterile gloves  Patient monitoring: cardiac monitor, continuous pulse ox, frequent blood pressure checks, IV access, oxygen and responsive to questions  Block type: Brachial plexus  Interscalene  Laterality: right  Injection technique: single-shot  Guidance: ultrasound guided  Local infiltration: bupivacaine  Infiltration strength: 0.25 %  Local infiltration: bupivacaine  Dose: 2 mL    Needle   Needle type: insulated echogenic nerve stimulator needle   Needle gauge: 22 G  Needle localization: anatomical landmarks and ultrasound guidance  Needle insertion depth: 2 cm  Test dose: negative  Needle length: 5 cm  Assessment   Injection assessment: negative aspiration for heme, no paresthesia on injection, local visualized surrounding nerve on ultrasound and no intravascular symptoms  Paresthesia pain: none  Slow fractionated injection: yes  Hemodynamics: stable  Outcomes: uncomplicated and patient tolerated procedure well    Medications Administered  midazolam (VERSED) injection 2 mg/2mL - IntraVENous   2 mg - 9/5/2024 10:30:00 AM  BUPivacaine (MARCAINE) PF injection 0.5% - Interscalene, Shoulder Right   10 mL - 9/5/2024 10:30:00 AM  BUPivacaine

## 2024-09-05 NOTE — ANESTHESIA POSTPROCEDURE EVALUATION
Department of Anesthesiology  Postprocedure Note    Patient: Cameron Washington  MRN: [id number]  YOB: 1950  Date of evaluation: 9/5/2024    Procedure Summary       Date: 09/05/24 Room / Location: [address]    Anesthesia Start: 1140 Anesthesia Stop: 1412    Procedure: Right Shoulder Arthroscopy, Rotator Cuff Repair with Biceps Tenotomy, Open Subacromial Decompression, and Labral Debridement (Right: Shoulder) Diagnosis:       Tear of right rotator cuff, unspecified tear extent, unspecified whether traumatic      Biceps tendinitis of right shoulder      (Tear of right rotator cuff, unspecified tear extent, unspecified whether traumatic [M75.101])      (Biceps tendinitis of right shoulder [M75.21])    Surgeons: Riccardo Basilio MD Responsible Provider: Georgie Navas MD    Anesthesia Type: general ASA Status: 3            Anesthesia Type: No value filed.    Ty Phase I: Ty Score: 9    Ty Phase II:      Anesthesia Post Evaluation    Patient location during evaluation: PACU  Patient participation: complete - patient participated  Level of consciousness: awake and alert  Pain score: 0  Airway patency: patent  Nausea & Vomiting: no nausea and no vomiting  Cardiovascular status: blood pressure returned to baseline  Respiratory status: acceptable and room air  Hydration status: euvolemic  Pain management: adequate and satisfactory to patient    No notable events documented.

## 2024-09-05 NOTE — ANESTHESIA PRE PROCEDURE
interval change  (+) hypertension: no interval change, CAD: no interval change, hyperlipidemia      ECG reviewed  Rhythm: regular  Rate: normal  Echocardiogram reviewed  Stress test reviewed             ROS comment: Left Ventricle: Normal left ventricular systolic function with a visually estimated EF of 60 - 65%. Left ventricle size is normal. Mildly/moderately increased wall thickness. Findings consistent with mild/moderate concentric hypertrophy. Normal wall motion. Grade I diastolic dysfunction.     Neuro/Psych:   (+) neuromuscular disease:             ROS comment: BACK SURGERY GI/Hepatic/Renal:            ROS comment: Diverticulitis.   Endo/Other:    (+) : arthritis: OA..                  ROS comment: Tear of right rotator cuff Abdominal: normal exam      Abdomen: soft.      Vascular:   + PVD, aortic or cerebral.        ROS comment: Aneurysm of ascending aorta without rupture  Ascending aorta dilatation . Other Findings:         Anesthesia Plan      general     ASA 3     (Medically and cardiac cleared by PCP and cardiologist after reviewing stress test results  GA and interscalene nerve block  Sinus samuel, 44/m preop)  Induction: intravenous.    MIPS: Postoperative opioids intended and Prophylactic antiemetics administered.  Anesthetic plan and risks discussed with patient.      Plan discussed with CRNA.          Post-op pain plan if not by surgeon: single peripheral nerve block          Georgie Navas MD   9/5/2024

## 2024-09-11 ENCOUNTER — OFFICE VISIT (OUTPATIENT)
Age: 74
End: 2024-09-11

## 2024-09-11 VITALS — WEIGHT: 241 LBS | HEIGHT: 70 IN | BODY MASS INDEX: 34.5 KG/M2

## 2024-09-11 DIAGNOSIS — M75.121 NONTRAUMATIC COMPLETE TEAR OF RIGHT ROTATOR CUFF: Primary | ICD-10-CM

## 2024-09-11 PROCEDURE — 99024 POSTOP FOLLOW-UP VISIT: CPT

## 2024-09-11 RX ORDER — METHYLPREDNISOLONE 4 MG
TABLET, DOSE PACK ORAL
Qty: 1 KIT | Refills: 0 | Status: SHIPPED | OUTPATIENT
Start: 2024-09-11

## 2024-09-18 ENCOUNTER — OFFICE VISIT (OUTPATIENT)
Age: 74
End: 2024-09-18

## 2024-09-18 VITALS — BODY MASS INDEX: 34.5 KG/M2 | HEIGHT: 70 IN | WEIGHT: 241 LBS

## 2024-09-18 DIAGNOSIS — M75.121 NONTRAUMATIC COMPLETE TEAR OF RIGHT ROTATOR CUFF: Primary | ICD-10-CM

## 2024-09-18 PROCEDURE — 99024 POSTOP FOLLOW-UP VISIT: CPT | Performed by: ORTHOPAEDIC SURGERY

## 2024-09-20 ENCOUNTER — HOSPITAL ENCOUNTER (OUTPATIENT)
Age: 74
Setting detail: THERAPIES SERIES
Discharge: HOME | End: 2024-09-20
Attending: ORTHOPAEDIC SURGERY
Payer: COMMERCIAL

## 2024-09-20 DIAGNOSIS — M12.811 ROTATOR CUFF ARTHROPATHY OF RIGHT SHOULDER: Primary | ICD-10-CM

## 2024-09-20 PROCEDURE — 97110 THERAPEUTIC EXERCISES: CPT

## 2024-09-20 PROCEDURE — 97161 PT EVAL LOW COMPLEX 20 MIN: CPT

## 2024-09-20 PROCEDURE — 97140 MANUAL THERAPY 1/> REGIONS: CPT

## 2024-09-21 RX ORDER — CELECOXIB 100 MG/1
CAPSULE ORAL
Qty: 180 CAPSULE | Refills: 3 | Status: SHIPPED | OUTPATIENT
Start: 2024-09-21

## 2024-09-24 ENCOUNTER — HOSPITAL ENCOUNTER (OUTPATIENT)
Age: 74
Setting detail: THERAPIES SERIES
Discharge: HOME | End: 2024-09-24
Attending: ORTHOPAEDIC SURGERY
Payer: COMMERCIAL

## 2024-09-24 PROCEDURE — 97140 MANUAL THERAPY 1/> REGIONS: CPT

## 2024-09-24 PROCEDURE — 97110 THERAPEUTIC EXERCISES: CPT

## 2024-09-27 ENCOUNTER — HOSPITAL ENCOUNTER (OUTPATIENT)
Age: 74
Setting detail: THERAPIES SERIES
Discharge: HOME | End: 2024-09-27
Attending: ORTHOPAEDIC SURGERY
Payer: COMMERCIAL

## 2024-09-27 PROCEDURE — 97140 MANUAL THERAPY 1/> REGIONS: CPT

## 2024-09-27 PROCEDURE — 97110 THERAPEUTIC EXERCISES: CPT

## 2024-09-30 ENCOUNTER — HOSPITAL ENCOUNTER (OUTPATIENT)
Age: 74
Setting detail: THERAPIES SERIES
Discharge: HOME | End: 2024-09-30
Attending: ORTHOPAEDIC SURGERY
Payer: COMMERCIAL

## 2024-09-30 PROCEDURE — 97140 MANUAL THERAPY 1/> REGIONS: CPT

## 2024-09-30 PROCEDURE — 97110 THERAPEUTIC EXERCISES: CPT

## 2024-09-30 NOTE — FLOWSHEET NOTE
[x] [facility]  Outpatient Rehabilitation & Therapy  [address], [place] [address]    Physical Therapy Daily Treatment Note      Date:  2024  Patient Name:  Cameron Washington    :  1950  MRN: [id number]  Physician: Riccardo Basilio MD                                 Insurance: TBD  Medical Diagnosis: Rt rotator cuff repair/biceps tenotomy              Rehab Codes: M25.511, m25.611, R53.1  Onset Date: 24                 Next 's appt: 10/16/24  Visit# / total visits:   Cancels/No Shows: 0/0    Subjective:    Pain:  [x] Yes  [] No Location: Rt shoulder/biceps     Pain Rating: (0-10 scale) 3/10  Pain altered Tx:  [] No  [] Yes  Action:  Comments:  States he is a little more sore today, thinks he slept on his shoulder wrong.     PMHx: [] Unremarkable        [] Diabetes     [] HTN                        [] Pacemaker              [] MI/Heart Problems [] Cancer       [] Arthritis       [x] Other: [place]; left rotator cuff tear              [x] Refer to full medical chart  In EPIC     Past Medical History:   Diagnosis Date    Arteriosclerotic heart disease     Asthma     Long time ago    Cellulitis of right upper limb     Resolved    Elevated prostate specific antigen (PSA)     Resolved    Essential hypertension     H/O colonoscopy     Fiberoptic    Intervertebral disc disorders with radiculopathy, lumbar region     Lesion of ulnar nerve, right upper limb     Resolved    Mixed hyperlipidemia     Personal history of other diseases of the respiratory system     Resolved    Prolonged emergence from general anesthesia     Unspecified fracture of shaft of humerus, right arm, initial encounter for closed fracture     Resolved     Past Surgical History:   Procedure Laterality Date    BACK SURGERY      COLONOSCOPY      Fiberoptic    ELBOW SURGERY      KNEE ARTHROSCOPY Right     SHOULDER ARTHROSCOPY Right 2024    Right Shoulder Arthroscopy, Rotator Cuff Repair with Biceps

## 2024-10-02 ENCOUNTER — HOSPITAL ENCOUNTER (OUTPATIENT)
Age: 74
Setting detail: THERAPIES SERIES
Discharge: HOME | End: 2024-10-02
Attending: ORTHOPAEDIC SURGERY
Payer: COMMERCIAL

## 2024-10-02 PROCEDURE — 97110 THERAPEUTIC EXERCISES: CPT

## 2024-10-02 PROCEDURE — 97140 MANUAL THERAPY 1/> REGIONS: CPT

## 2024-10-02 NOTE — FLOWSHEET NOTE
[x] [facility]  Outpatient Rehabilitation & Therapy  [address], [place] [address]    Physical Therapy Daily Treatment Note      Date:  10/2/2024  Patient Name:  Cameron Washington    :  1950  MRN: [id number]  Physician: Riccardo Basilio MD                                 Insurance: TBD  Medical Diagnosis: Rt rotator cuff repair/biceps tenotomy              Rehab Codes: M25.511, m25.611, R53.1  Onset Date: 24                 Next 's appt: 10/16/24  Visit# / total visits:   Cancels/No Shows: 0/0    Subjective:    Pain:  [x] Yes  [] No Location: Rt shoulder/biceps     Pain Rating: (0-10 scale) 2/10  Pain altered Tx:  [] No  [] Yes  Action:  Comments:  Feels his shoulder is moving a little better.      PMHx: [] Unremarkable        [] Diabetes     [] HTN                        [] Pacemaker              [] MI/Heart Problems [] Cancer       [] Arthritis       [x] Other: [place]; left rotator cuff tear              [x] Refer to full medical chart  In EPIC     Past Medical History:   Diagnosis Date    Arteriosclerotic heart disease     Asthma     Long time ago    Cellulitis of right upper limb     Resolved    Elevated prostate specific antigen (PSA)     Resolved    Essential hypertension     H/O colonoscopy     Fiberoptic    Intervertebral disc disorders with radiculopathy, lumbar region     Lesion of ulnar nerve, right upper limb     Resolved    Mixed hyperlipidemia     Personal history of other diseases of the respiratory system     Resolved    Prolonged emergence from general anesthesia     Unspecified fracture of shaft of humerus, right arm, initial encounter for closed fracture     Resolved     Past Surgical History:   Procedure Laterality Date    BACK SURGERY      COLONOSCOPY      Fiberoptic    ELBOW SURGERY      KNEE ARTHROSCOPY Right     SHOULDER ARTHROSCOPY Right 2024    Right Shoulder Arthroscopy, Rotator Cuff Repair with Biceps Tenotomy, Open Subacromial

## 2024-10-03 RX ORDER — TADALAFIL 5 MG/1
TABLET ORAL
Qty: 90 TABLET | Refills: 0 | Status: SHIPPED | OUTPATIENT
Start: 2024-10-03

## 2024-10-04 ENCOUNTER — HOSPITAL ENCOUNTER (OUTPATIENT)
Age: 74
Setting detail: THERAPIES SERIES
Discharge: HOME | End: 2024-10-04
Attending: ORTHOPAEDIC SURGERY
Payer: COMMERCIAL

## 2024-10-04 NOTE — FLOWSHEET NOTE
lifting/carrying, bathing, grooming and yardwork/chores.    ? AROM: right shoulder to WNLs to improve reaching overhead/behind his back, lifting/carrying, bathing, grooming and yardwork/chores.    ? Strength: Rt UE to 5/5 to improve reaching overhead/behind his back, lifting/carrying, bathing, grooming and yardwork/chores.    ? Function: SPADI to 15/130  Patient to be independent with home exercise program as demonstrated by performance with correct form without cues.     LTG: (to be met in 36 treatments)  Patient will return to normal function including reaching overhead/behind his back, lifting/carrying, bathing, grooming and yardwork/chores with minimal pain/difficulty.                    Patient goals:  \"keep arm from binding up\"      Pt. Education:  [x] Plans/Goals, Risks/Benefits discussed  [x] Home exercise program  Method of Education: [x] Verbal  [x] Demo  [] Written  Comprehension of Education:  [] Verbalizes understanding.  [] Demonstrates understanding.  [x] Needs Review.  [] Demonstrates/verbalizes understanding of HEP/Ed previously given.  Access Code: [email or web address]  URL: [email or web address]  Date: 09/20/2024  Prepared by: Maik Larson     Exercises  - Circular Shoulder Pendulum with Table Support  - 3 x daily - 7 x weekly - 2 sets - 10 reps  - Flexion-Extension Shoulder Pendulum with Table Support  - 3 x daily - 7 x weekly - 2 sets - 10 reps  - Horizontal Shoulder Pendulum with Table Support  - 3 x daily - 7 x weekly - 2 sets - 10 reps  - Standing Shoulder Shrugs  - 2 x daily - 7 x weekly - 2 sets - 10 reps  - Seated Scapular Retraction  - 2 x daily - 7 x weekly - 2 sets - 10 reps  - Standing Elbow Flexion Extension AROM  - 2 x daily - 7 x weekly - 2 sets - 10 reps  - Seated Shoulder Flexion Towel Slide at Table Top  - 2 x daily - 7 x weekly - 2 sets - 10 reps  - Seated Shoulder Abduction Towel Slide at Table Top  - 1 x daily - 7 x weekly - 2 sets - 10 reps  - Standing Shoulder Abduction ROM with

## 2024-10-07 ENCOUNTER — HOSPITAL ENCOUNTER (OUTPATIENT)
Age: 74
Setting detail: THERAPIES SERIES
Discharge: HOME | End: 2024-10-07
Attending: ORTHOPAEDIC SURGERY
Payer: COMMERCIAL

## 2024-10-07 PROCEDURE — 97140 MANUAL THERAPY 1/> REGIONS: CPT

## 2024-10-07 PROCEDURE — 97110 THERAPEUTIC EXERCISES: CPT

## 2024-10-07 NOTE — FLOWSHEET NOTE
[x] [facility]  Outpatient Rehabilitation & Therapy  [address], [place] [address]    Physical Therapy Daily Treatment Note      Date:  10/7/2024  Patient Name:  Cameron Washington    :  1950  MRN: [id number]  Physician: Riccardo Basilio MD                                 Insurance: TBD  Medical Diagnosis: Rt rotator cuff repair/biceps tenotomy              Rehab Codes: M25.511, m25.611, R53.1  Onset Date: 24                 Next 's appt: 10/16/24  Visit# / total visits:   Cancels/No Shows: 0/0    Subjective:    Pain:  [x] Yes  [] No Location: Rt shoulder/biceps     Pain Rating: (0-10 scale) 4/10  Pain altered Tx:  [] No  [] Yes  Action:  Comments:  states his biceps is sore today-not sure why.   Took some Ibuprofen.     PMHx: [] Unremarkable        [] Diabetes     [] HTN                        [] Pacemaker              [] MI/Heart Problems [] Cancer       [] Arthritis       [x] Other: [place]; left rotator cuff tear              [x] Refer to full medical chart  In EPIC     Past Medical History:   Diagnosis Date    Arteriosclerotic heart disease     Asthma     Long time ago    Cellulitis of right upper limb     Resolved    Elevated prostate specific antigen (PSA)     Resolved    Essential hypertension     H/O colonoscopy     Fiberoptic    Intervertebral disc disorders with radiculopathy, lumbar region     Lesion of ulnar nerve, right upper limb     Resolved    Mixed hyperlipidemia     Personal history of other diseases of the respiratory system     Resolved    Prolonged emergence from general anesthesia     Unspecified fracture of shaft of humerus, right arm, initial encounter for closed fracture     Resolved     Past Surgical History:   Procedure Laterality Date    BACK SURGERY      COLONOSCOPY      Fiberoptic    ELBOW SURGERY      KNEE ARTHROSCOPY Right     SHOULDER ARTHROSCOPY Right 2024    Right Shoulder Arthroscopy, Rotator Cuff Repair with Biceps Tenotomy, Open

## 2024-10-09 ENCOUNTER — HOSPITAL ENCOUNTER (OUTPATIENT)
Age: 74
Setting detail: THERAPIES SERIES
Discharge: HOME | End: 2024-10-09
Attending: ORTHOPAEDIC SURGERY
Payer: COMMERCIAL

## 2024-10-09 PROCEDURE — 97110 THERAPEUTIC EXERCISES: CPT

## 2024-10-09 NOTE — FLOWSHEET NOTE
reps  - Standing Elbow Flexion Extension AROM  - 2 x daily - 7 x weekly - 2 sets - 10 reps  - Seated Shoulder Flexion Towel Slide at Table Top  - 2 x daily - 7 x weekly - 2 sets - 10 reps  - Seated Shoulder Abduction Towel Slide at Table Top  - 1 x daily - 7 x weekly - 2 sets - 10 reps  - Standing Shoulder Abduction ROM with Dowel (Mirrored)  - 2 x daily - 7 x weekly - 2 sets - 10 reps       Access Code: [email or web address]  URL: [email or web address]  Date: 09/24/2024  Prepared by: Maik Larson    Exercises  - Standing 'L' Stretch at Counter  - 2 x daily - 7 x weekly - 4 sets - 15 seconds hold  - Standing Shoulder External Rotation Stretch in Doorway (Mirrored)  - 2 x daily - 7 x weekly - 4 sets - 15 seconds hold  - Supine Shoulder Flexion Extension AAROM with Dowel  - 2 x daily - 7 x weekly - 3 sets - 10 reps  - Sleeper Stretch (Mirrored)  - 1 x daily - 7 x weekly - 4 sets - 15 seconds hold    9/27: discussed right shoulder positioning exercise for abd/ER with arm propped on a table for home.     10/4:  added serratus press with cane.  Pt did not want pictures.      Plan: [x] Continue per plan of care.   [] Other:      Treatment Charges: Mins Units   [x]  Modalities- Ice 10 0   [x]  Ther Exercise 40 3   []  Manual Therapy     []  Ther Activities     []  Aquatics     []  Neuromuscular     [] Vasocompression     [] Gait Training     [] Dry needling        [] 1 or 2 muscles        [] 3 or more muscles     []  Other     Total Treatment time 40 3     Time In:  7:50 am            Time Out:0840 am    Electronically signed by:  Emerald Hernandez PTA

## 2024-10-11 ENCOUNTER — TELEPHONE (OUTPATIENT)
Age: 74
End: 2024-10-11

## 2024-10-11 ENCOUNTER — HOSPITAL ENCOUNTER (OUTPATIENT)
Age: 74
Setting detail: THERAPIES SERIES
Discharge: HOME | End: 2024-10-11
Attending: ORTHOPAEDIC SURGERY
Payer: COMMERCIAL

## 2024-10-11 DIAGNOSIS — M75.121 NONTRAUMATIC COMPLETE TEAR OF RIGHT ROTATOR CUFF: Primary | ICD-10-CM

## 2024-10-11 RX ORDER — DOXYCYCLINE HYCLATE 100 MG
100 TABLET ORAL 2 TIMES DAILY
Qty: 20 TABLET | Refills: 0 | Status: SHIPPED | OUTPATIENT
Start: 2024-10-11 | End: 2024-10-21

## 2024-10-11 NOTE — TELEPHONE ENCOUNTER
Patient called with concerns with his surgical shoulder. Patient stopped in the office where [facility] and  assessed the shoulder. There is redness and warmth noted. Patient to be started on Doxycycline for the next 10 days and will then be rececked next Wednesday with his visit with Dr. Basilio

## 2024-10-11 NOTE — FLOWSHEET NOTE
[] [facility]  Outpatient Rehabilitation & Therapy  [address]   [place], [place] [address]       Physical Therapy Cancel/No Show note    Date: 10/11/2024  Patient: Cameron Washington  : 1950  MRN: [id number]      Cancels/No Shows to date:     For today's appointment patient:    [x]  Cancelled    [] Rescheduled appointment    [] No-show     Reason given by patient:    []  Patient ill    []  Conflicting appointment    [] No transportation      [] Conflict with work    [] No reason given    [] Weather related    [] COVID-19    [x] Other:      Comments:  Patient arrived to clinic (late for appt time due to stopping at his MD office first)- he has circular red area surrounding incision (which he just noticed recently). Suggested patient call ortho office to see if they would like to look at it. After speaking to staff at surgeons office he stated he was told to not do therapy today and to come in so they could examine.       [x] Next appointment was confirmed    Electronically signed by: Emerald Hernandez PTA

## 2024-10-14 ENCOUNTER — HOSPITAL ENCOUNTER (OUTPATIENT)
Age: 74
Setting detail: THERAPIES SERIES
Discharge: HOME | End: 2024-10-14
Attending: ORTHOPAEDIC SURGERY
Payer: COMMERCIAL

## 2024-10-14 PROCEDURE — 97110 THERAPEUTIC EXERCISES: CPT

## 2024-10-14 NOTE — FLOWSHEET NOTE
[x] [facility]  Outpatient Rehabilitation & Therapy  [address], [place] [address]    Physical Therapy Daily Treatment Note      Date:  10/14/2024  Patient Name:  Cameron Washington    :  1950  MRN: [id number]  Physician: Riccardo Basilio MD                                 Insurance: TBD  Medical Diagnosis: Rt rotator cuff repair/biceps tenotomy              Rehab Codes: M25.511, m25.611, R53.1  Onset Date: 24                 Next 's appt: 10/16/24  Visit# / total visits:   Cancels/No Shows: 0/0    Subjective:    Pain:  [x] Yes  [] No Location: Rt shoulder/deltoid     Pain Rating: (0-10 scale) 3-4/10  Pain altered Tx:  [] No  [] Yes  Action:  Comments:  Taking Ibuprofen with pain pills.  Able to sleep in bed now.  Saw PA on Friday who looked at his incision.  Gave him antibiotics he is to be on for 10 days.     PMHx: [] Unremarkable        [] Diabetes     [] HTN                        [] Pacemaker              [] MI/Heart Problems [] Cancer       [] Arthritis       [x] Other: [place]; left rotator cuff tear              [x] Refer to full medical chart  In EPIC     Past Medical History:   Diagnosis Date    Arteriosclerotic heart disease     Asthma     Long time ago    Cellulitis of right upper limb     Resolved    Elevated prostate specific antigen (PSA)     Resolved    Essential hypertension     H/O colonoscopy     Fiberoptic    Intervertebral disc disorders with radiculopathy, lumbar region     Lesion of ulnar nerve, right upper limb     Resolved    Mixed hyperlipidemia     Personal history of other diseases of the respiratory system     Resolved    Prolonged emergence from general anesthesia     Unspecified fracture of shaft of humerus, right arm, initial encounter for closed fracture     Resolved     Past Surgical History:   Procedure Laterality Date    BACK SURGERY      COLONOSCOPY      Fiberoptic    ELBOW SURGERY      KNEE ARTHROSCOPY Right     SHOULDER ARTHROSCOPY

## 2024-10-16 ENCOUNTER — HOSPITAL ENCOUNTER (OUTPATIENT)
Age: 74
Setting detail: THERAPIES SERIES
Discharge: HOME | End: 2024-10-16
Attending: ORTHOPAEDIC SURGERY
Payer: COMMERCIAL

## 2024-10-16 ENCOUNTER — OFFICE VISIT (OUTPATIENT)
Age: 74
End: 2024-10-16

## 2024-10-16 DIAGNOSIS — Z09 S/P ORTHOPEDIC SURGERY, FOLLOW-UP EXAM: Primary | ICD-10-CM

## 2024-10-16 PROCEDURE — 99024 POSTOP FOLLOW-UP VISIT: CPT | Performed by: ORTHOPAEDIC SURGERY

## 2024-10-16 PROCEDURE — 97110 THERAPEUTIC EXERCISES: CPT

## 2024-10-16 NOTE — PROGRESS NOTES
[x] [facility]  Outpatient Rehabilitation & Therapy  [address], [place] [address]    Physical Therapy Daily Treatment Note/Progress note      Date:  10/16/2024  Patient Name:  Cameron Washington    :  1950  MRN: [id number]  Physician: Riccardo Basilio MD                                 Insurance: TBD  Medical Diagnosis: Rt rotator cuff repair/biceps tenotomy              Rehab Codes: M25.511, m25.611, R53.1  Onset Date: 24                 Next 's appt: 10/16/24  Visit# / total visits: 10/36  Cancels/No Shows: 0/0  Reporting period: 24-10/16/24    Subjective:    Pain:  [x] Yes  [] No Location: Rt shoulder/deltoid     Pain Rating: (0-10 scale) 2/10  Pain altered Tx:  [] No  [] Yes  Action:  Comments:  saw his surgeon today who was pleased with his progress.  Patient reports pain up to 4/10 at worst.  Still some difficulty sleeping, reaching overhead, bathing, dressing, reaching for back pocket and is avoiding heavy lifting.    PMHx: [] Unremarkable        [] Diabetes     [] HTN                        [] Pacemaker              [] MI/Heart Problems [] Cancer       [] Arthritis       [x] Other: [place]; left rotator cuff tear              [x] Refer to full medical chart  In EPIC     Past Medical History:   Diagnosis Date    Arteriosclerotic heart disease     Asthma     Long time ago    Cellulitis of right upper limb     Resolved    Elevated prostate specific antigen (PSA)     Resolved    Essential hypertension     H/O colonoscopy     Fiberoptic    Intervertebral disc disorders with radiculopathy, lumbar region     Lesion of ulnar nerve, right upper limb     Resolved    Mixed hyperlipidemia     Personal history of other diseases of the respiratory system     Resolved    Prolonged emergence from general anesthesia     Unspecified fracture of shaft of humerus, right arm, initial encounter for closed fracture     Resolved     Past Surgical History:   Procedure Laterality Date

## 2024-10-16 NOTE — PROGRESS NOTES
[facility]    [facility]  [address], [address]  [place] [address]    Surgery:    9/5/2024  Right Shoulder Arthroscopy, Rotator Cuff Repair with Biceps Tenotomy, Open Subacromial Decompression, and Labral Debridement - Right    History of Present Illness:    This is a 74 y.o. male who presents to the clinic today for post op follow up for Right Shoulder Arthroscopy, Rotator Cuff Repair with Biceps Tenotomy, Open Subacromial Decompression, and Labral Debridement - Right on 9/5/2024 .  He is doing well postoperative.  We did start him on some antibiotics as he has a bit of redness around his incision.  This is improving.    On examination he has some redness around his incision over the front the shoulder however it does seem to be resolving per the family and himself.  He can forward elevate the arm to probably about 100 reason for elevation.  Passive I can bring him up too much further than this.  Otherwise he is doing well.    Overall he is doing well.  I like him to continue with therapy he can advance with strengthening.  I will see him back in about 6 weeks          Electronically signed by Riccardo Basilio MD on 10/16/2024 at 9:38 AM

## 2024-10-17 ENCOUNTER — TELEPHONE (OUTPATIENT)
Age: 74
End: 2024-10-17

## 2024-10-17 ENCOUNTER — OFFICE VISIT (OUTPATIENT)
Age: 74
End: 2024-10-17
Payer: COMMERCIAL

## 2024-10-17 VITALS
DIASTOLIC BLOOD PRESSURE: 92 MMHG | OXYGEN SATURATION: 97 % | SYSTOLIC BLOOD PRESSURE: 150 MMHG | BODY MASS INDEX: 34.67 KG/M2 | WEIGHT: 241.6 LBS | HEART RATE: 64 BPM

## 2024-10-17 DIAGNOSIS — I10 ESSENTIAL HYPERTENSION: Primary | ICD-10-CM

## 2024-10-17 PROCEDURE — 99214 OFFICE O/P EST MOD 30 MIN: CPT | Performed by: INTERNAL MEDICINE

## 2024-10-17 PROCEDURE — 3080F DIAST BP >= 90 MM HG: CPT | Performed by: INTERNAL MEDICINE

## 2024-10-17 PROCEDURE — 3077F SYST BP >= 140 MM HG: CPT | Performed by: INTERNAL MEDICINE

## 2024-10-17 PROCEDURE — 93000 ELECTROCARDIOGRAM COMPLETE: CPT | Performed by: INTERNAL MEDICINE

## 2024-10-17 PROCEDURE — 1123F ACP DISCUSS/DSCN MKR DOCD: CPT | Performed by: INTERNAL MEDICINE

## 2024-10-17 NOTE — PROGRESS NOTES
Perfusion Defect: There is a moderate severity left ventricular stress perfusion defect present in the basal to mid inferior segment(s) that is fixed. The defect appears to be infarction.    Perfusion Conclusion: There is no evidence of transient ischemic dilation (TID). TID ratio is 1.03.    ECG: Resting ECG demonstrates sinus bradycardia.    ECG: The stress ECG was negative for ischemia.    Stress Test: A pharmacological stress test was performed using regadenoson (Lexiscan). PO caffeine given as a reversal agent. The patient reported no chest pain during the stress test. Blood pressure demonstrated a normal response and heart rate demonstrated a normal response to stress.    Past Medical and Surgical History, Problem List, Allergies, Medications, Labs, Imaging, all reviewed extensively in EMR and with the patient.    Assessment:   Dyspnea on exertion, stable  Ascending aortic aneurysm measuring 4.5 cm  Coronary artery calcifications on Chest imaging  No stress-induced ischemia on Lexiscan stress test  Coronary artery disease   Hypertension   Meniere;s disease       Plan:   Stress test findings discussed with patient in detail.  There is evidence of inferior infarct however no ischemia.  At this point of time we decided to continue optimal medical management.  In case of any change in symptoms or new symptoms, I will proceed with coronary angiography.  Patient was counseled regarding symptoms of progressive CAD or acute coronary syndrome in detail.  Holter and echo low risk  Continue to monitor blood pressure Will consider  Plan to repeat CTA Chest in 9-12 months   Continue asa  Increase po hydration       The patient was counseled regarding heart healthy diet, weight loss and exercise as tolerated. Patient's medications and side effects were discussed. All questions and concerns were addressed to patient's satisfaction.     The patient is to follow up in 6 months or sooner if necessary.     Thank you for allowing

## 2024-10-18 ENCOUNTER — HOSPITAL ENCOUNTER (OUTPATIENT)
Age: 74
Setting detail: THERAPIES SERIES
Discharge: HOME | End: 2024-10-18
Attending: ORTHOPAEDIC SURGERY
Payer: COMMERCIAL

## 2024-10-18 PROCEDURE — 97110 THERAPEUTIC EXERCISES: CPT

## 2024-10-18 NOTE — FLOWSHEET NOTE
Decompression, and Labral Debridement performed by Riccardo Basilio MD at [facility] OR    VENTRAL HERNIA REPAIR           Objective:      Objective:                   ROM  °A/P END FEEL STRENGTH TESTS (+/-) Left Right Not Tested     Left Right   Left Right Drop Arm     []    Sit Shld Flex           Sulcus Sign     []    Sit Shld Abd           Apprehension     []    Sit Shld IR           Alin     []    Shoulder Flex 148 85//140     3- Speeds     []    Ext           Neer     []     70/1150     3- Pereira      []    ER @ 0  70 48/70     3- Painful Arc     []    IR T6 -/76     NT Tinel     []    Supraspinatus                     Infraspinatus                     Serratus Ant                     Pectoralis                     Lats                     Mid Trap                     Lower Trap                     Elbow Flex.   WNL                 Elbow Ext.   -22                 Pronation                     Supination                      48 lbs 44 lbs                       OBSERVATION No Deficit Deficit Not Tested Comments   Forward Head []  []  []      Rounded Shoulders []  []  []      Kyphosis []  []  []      Scapular Height/Position []  []  []      Winging []  []  []      Scapulohumeral Rhythm []  []  []      INSPECTION/PALPATION           SC/AC Joint []  []  []      Supraspinatus []  []  []      Biceps tendon/groove []  []  []      Posterior shld []  []  []      Subscapularis []  []  []      NEUROLOGICAL           Cervical ROM/Quadrant []  []  []      Reflexes []  []  []      Compression/Distraction []  []  []      Sensation []  []  []         Functional Test: SPADI Score: Raw score of 65/130,   which is 50% functionally impaired (initially); 31/130: 24% impaired (currently)      Comments:     10/9- no significant swelling / abnormalities palpated in right UE    Modalities: Ice x10' right shoulder NP  Precautions: right Humerus Fx in past with impaired elbow movement and some nerve damage affecting

## 2024-10-21 ENCOUNTER — HOSPITAL ENCOUNTER (OUTPATIENT)
Age: 74
Setting detail: THERAPIES SERIES
Discharge: HOME | End: 2024-10-21
Attending: ORTHOPAEDIC SURGERY
Payer: COMMERCIAL

## 2024-10-21 PROCEDURE — 97110 THERAPEUTIC EXERCISES: CPT

## 2024-10-21 NOTE — FLOWSHEET NOTE
[x] [facility]  Outpatient Rehabilitation & Therapy  [address], [place] [address]    Physical Therapy Daily Treatment Note      Date:  10/21/2024  Patient Name:  Cameron Washington    :  1950  MRN: [id number]  Physician: Riccardo Basilio MD                                 Insurance: TBD  Medical Diagnosis: Rt rotator cuff repair/biceps tenotomy              Rehab Codes: M25.511, m25.611, R53.1  Onset Date: 24                 Next 's appt: 10/16/24  Visit# / total visits:   Cancels/No Shows: 0/0      Subjective:    Pain:  [x] Yes  [] No Location: Rt shoulder/deltoid     Pain Rating: (0-10 scale) 2/10  Pain altered Tx:  [] No  [] Yes  Action:  Comments:  Denies new issues. States he was able to use a leaf blower yesterday without issue- just frequently alternated arms. States he is able to completely wash his hair with affected UE now.     PMHx: [] Unremarkable        [] Diabetes     [] HTN                        [] Pacemaker              [] MI/Heart Problems [] Cancer       [] Arthritis       [x] Other: [place]; left rotator cuff tear              [x] Refer to full medical chart  In EPIC     Past Medical History:   Diagnosis Date    Arteriosclerotic heart disease     Asthma     Long time ago    Cellulitis of right upper limb     Resolved    Elevated prostate specific antigen (PSA)     Resolved    Essential hypertension     H/O colonoscopy     Fiberoptic    Intervertebral disc disorders with radiculopathy, lumbar region     Lesion of ulnar nerve, right upper limb     Resolved    Mixed hyperlipidemia     Personal history of other diseases of the respiratory system     Resolved    Prolonged emergence from general anesthesia     Unspecified fracture of shaft of humerus, right arm, initial encounter for closed fracture     Resolved     Past Surgical History:   Procedure Laterality Date    BACK SURGERY      COLONOSCOPY      Fiberoptic    ELBOW SURGERY      KNEE ARTHROSCOPY

## 2024-10-24 ENCOUNTER — APPOINTMENT (OUTPATIENT)
Age: 74
End: 2024-10-24
Attending: ORTHOPAEDIC SURGERY
Payer: COMMERCIAL

## 2024-10-25 ENCOUNTER — HOSPITAL ENCOUNTER (OUTPATIENT)
Age: 74
Setting detail: THERAPIES SERIES
Discharge: HOME | End: 2024-10-25
Attending: ORTHOPAEDIC SURGERY
Payer: COMMERCIAL

## 2024-10-25 PROCEDURE — 97110 THERAPEUTIC EXERCISES: CPT

## 2024-10-25 NOTE — FLOWSHEET NOTE
including reaching overhead/behind his back, lifting/carrying, bathing, grooming and yardwork/chores with minimal pain/difficulty.     STG: (to be met in 18 treatments)  ? Pain: to 0-2/10 right shoulder to improve reaching overhead/behind his back, lifting/carrying, bathing, grooming and yardwork/chores.  10/16: progressing  ? AROM: right shoulder to WNLs to improve reaching overhead/behind his back, lifting/carrying, bathing, grooming and yardwork/chores.  10/16: progressing (see objective)  ? Strength: Rt UE to 5/5 to improve reaching overhead/behind his back, lifting/carrying, bathing, grooming and yardwork/chores.  10/16: progressing (see objective)  ? Function: SPADI to 15/130 10/16: progressing  Patient to be independent with home exercise program as demonstrated by performance with correct form without cues. 10/16: progressing     LTG: (to be met in 36 treatments)  Patient will return to normal function including reaching overhead/behind his back, lifting/carrying, bathing, grooming and yardwork/chores with minimal pain/difficulty. 10/16: progressing                    Patient goals:  \"keep arm from binding up\"      Pt. Education:  [x] Plans/Goals, Risks/Benefits discussed  [x] Home exercise program  Method of Education: [x] Verbal  [x] Demo  [] Written  Comprehension of Education:  [] Verbalizes understanding.  [] Demonstrates understanding.  [x] Needs Review.  [] Demonstrates/verbalizes understanding of HEP/Ed previously given.  Access Code: [email or web address]  URL: [email or web address]  Date: 09/20/2024  Prepared by: Maik Larson     Exercises  - Circular Shoulder Pendulum with Table Support  - 3 x daily - 7 x weekly - 2 sets - 10 reps  - Flexion-Extension Shoulder Pendulum with Table Support  - 3 x daily - 7 x weekly - 2 sets - 10 reps  - Horizontal Shoulder Pendulum with Table Support  - 3 x daily - 7 x weekly - 2 sets - 10 reps  - Standing Shoulder Shrugs  - 2 x daily - 7 x weekly - 2 sets - 10 reps  -

## 2024-10-28 ENCOUNTER — HOSPITAL ENCOUNTER (OUTPATIENT)
Age: 74
Setting detail: THERAPIES SERIES
Discharge: HOME | End: 2024-10-28
Attending: ORTHOPAEDIC SURGERY
Payer: COMMERCIAL

## 2024-10-28 PROCEDURE — 97110 THERAPEUTIC EXERCISES: CPT

## 2024-10-28 NOTE — FLOWSHEET NOTE
[x] [facility]  Outpatient Rehabilitation & Therapy  [address], [place] [address]    Physical Therapy Daily Treatment Note      Date:  10/28/2024  Patient Name:  Cameron Washington    :  1950  MRN: [id number]  Physician: Riccardo Basilio MD                                 Insurance: TBD  Medical Diagnosis: Rt rotator cuff repair/biceps tenotomy              Rehab Codes: M25.511, m25.611, R53.1  Onset Date: 24                 Next 's appt: 10/16/24  Visit# / total visits:   Cancels/No Shows: 0/0      Subjective:    Pain:  [x] Yes  [] No Location: Rt shoulder/deltoid     Pain Rating: (0-10 scale) 2/10  Pain altered Tx:  [] No  [] Yes  Action:  Comments:  No problems starting new job last week, but mostly watched videos (training). States he did carry cardboard out, but no issues with this.     PMHx: [] Unremarkable        [] Diabetes     [] HTN                        [] Pacemaker              [] MI/Heart Problems [] Cancer       [] Arthritis       [x] Other: [place]; left rotator cuff tear              [x] Refer to full medical chart  In EPIC     Past Medical History:   Diagnosis Date    Arteriosclerotic heart disease     Asthma     Long time ago    Cellulitis of right upper limb     Resolved    Elevated prostate specific antigen (PSA)     Resolved    Essential hypertension     H/O colonoscopy     Fiberoptic    Intervertebral disc disorders with radiculopathy, lumbar region     Lesion of ulnar nerve, right upper limb     Resolved    Mixed hyperlipidemia     Personal history of other diseases of the respiratory system     Resolved    Prolonged emergence from general anesthesia     Unspecified fracture of shaft of humerus, right arm, initial encounter for closed fracture     Resolved     Past Surgical History:   Procedure Laterality Date    BACK SURGERY      COLONOSCOPY      Fiberoptic    ELBOW SURGERY      KNEE ARTHROSCOPY Right     SHOULDER ARTHROSCOPY Right 2024

## 2024-10-31 ENCOUNTER — HOSPITAL ENCOUNTER (OUTPATIENT)
Age: 74
Setting detail: THERAPIES SERIES
Discharge: HOME | End: 2024-10-31
Attending: ORTHOPAEDIC SURGERY
Payer: COMMERCIAL

## 2024-10-31 PROCEDURE — 97110 THERAPEUTIC EXERCISES: CPT

## 2024-10-31 NOTE — FLOWSHEET NOTE
Towel Slide at Table Top  - 2 x daily - 7 x weekly - 2 sets - 10 reps  - Seated Shoulder Abduction Towel Slide at Table Top  - 1 x daily - 7 x weekly - 2 sets - 10 reps  - Standing Shoulder Abduction ROM with Dowel (Mirrored)  - 2 x daily - 7 x weekly - 2 sets - 10 reps       Access Code: [email or web address]  URL: [email or web address]  Date: 09/24/2024  Prepared by: Maik Larson    Exercises  - Standing 'L' Stretch at Counter  - 2 x daily - 7 x weekly - 4 sets - 15 seconds hold  - Standing Shoulder External Rotation Stretch in Doorway (Mirrored)  - 2 x daily - 7 x weekly - 4 sets - 15 seconds hold  - Supine Shoulder Flexion Extension AAROM with Dowel  - 2 x daily - 7 x weekly - 3 sets - 10 reps  - Sleeper Stretch (Mirrored)  - 1 x daily - 7 x weekly - 4 sets - 15 seconds hold    9/27: discussed right shoulder positioning exercise for abd/ER with arm propped on a table for home.     10/4:  added serratus press with cane.  Pt did not want pictures.    10/25: added t-band rows/shld ext.     Plan: [x] Continue per plan of care.   [] Other:      Treatment Charges: Mins Units   [x]  Modalities- Ice 8:35-8:40 am 5 0   [x]  Ther Exercise 7:55-8:35 am  40 3   []  Manual Therapy     []  Ther Activities     []  Aquatics     []  Neuromuscular     [] Vasocompression     [] Gait Training     [] Dry needling        [] 1 or 2 muscles        [] 3 or more muscles     []  Other     Total Treatment time 40 3     Time In:  7:55 am          Time Out: 0840 am     Electronically signed by:  Emerald Hernandez PTA

## 2024-11-04 ENCOUNTER — HOSPITAL ENCOUNTER (OUTPATIENT)
Age: 74
Setting detail: THERAPIES SERIES
End: 2024-11-04
Attending: ORTHOPAEDIC SURGERY
Payer: COMMERCIAL

## 2024-11-05 ENCOUNTER — HOSPITAL ENCOUNTER (OUTPATIENT)
Age: 74
Setting detail: THERAPIES SERIES
Discharge: HOME | End: 2024-11-05
Attending: ORTHOPAEDIC SURGERY
Payer: COMMERCIAL

## 2024-11-05 PROCEDURE — 97110 THERAPEUTIC EXERCISES: CPT

## 2024-11-05 NOTE — FLOWSHEET NOTE
some nerve damage affecting fingers  Exercises:   Exercise Reps/ Time Weight/ Level Comments   Pulleys  3'       4-way pendulum 15x               Biceps curls 15x    2#   Standing cane abd/ER 15x abd        T-band: row/shld ext 15x ea.   yellow  added 10/25   ER/IR walkouts X15 each yellow Added 10/28   Wall walks 5x       Counter scrubs flex, circles, abd 10x ea.        Counter flexion stretch  4x15\"        Wall ER stretch  4x15\"                  Seated shld flex/PNF D2 flexion AAROM 15x ea.  Min assist Added 10/4   Supine  flexion  15  AROM  changed to AROM 10/28   serratus press  15  2#  added 10/4   Supine abc's 1x   Added 10/16         Sidelying shld ER/abd 15x active Added 9/27   Side hor abd 10x SBA Added 10/18   Prone shld ext-active 10-15x NP  Added 10/14                     PROM 8'    Flex: 154 deg (was 150 deg); Abd: 140 deg (was 140); ER: 50 deg (was 45 deg) ; IR: WNL.    ICE X10'   In supine-patient requested to leave after 5'   Other: *Follow surgeon's protocol     Specific Instructions for next treatment: Monitor response to today's visit and home program compliance.  Monitor addition of weight to supine press. Continue working on full motion and light strengthening.       Assessment: [x] Progressing toward goals. Pts R shoulder pain elevated some today as he was reaching overhead at work last night; pt guarding w/ shoulder flexion ROM that he relates to shoulder being aggravated after work; subscapularis is tender and guarded         [] No change.       [] Other:    [x] Patient would benefit from skilled physical therapy services in order to: decrease pain, improve ROM, improve strength, and return to normal function including reaching overhead/behind his back, lifting/carrying, bathing, grooming and yardwork/chores with minimal pain/difficulty.     STG: (to be met in 18 treatments)  ? Pain: to 0-2/10 right shoulder to improve reaching overhead/behind his back, lifting/carrying, bathing, grooming and

## 2024-11-07 ENCOUNTER — HOSPITAL ENCOUNTER (OUTPATIENT)
Age: 74
Setting detail: THERAPIES SERIES
Discharge: HOME | End: 2024-11-07
Attending: ORTHOPAEDIC SURGERY
Payer: COMMERCIAL

## 2024-11-07 PROCEDURE — 97016 VASOPNEUMATIC DEVICE THERAPY: CPT

## 2024-11-07 PROCEDURE — 97110 THERAPEUTIC EXERCISES: CPT

## 2024-11-07 NOTE — FLOWSHEET NOTE
[x] [facility]  Outpatient Rehabilitation & Therapy  [address], [place] [address]    Physical Therapy Daily Treatment Note      Date:  2024  Patient Name:  Cameron Washington    :  1950  MRN: [id number]  Physician: Riccardo Basilio MD                                 Insurance: [place]- visits BMN  Medical Diagnosis: Rt rotator cuff repair/biceps tenotomy              Rehab Codes: M25.511, m25.611, R53.1  Onset Date: 24                 Next 's appt: 10/16/24  Visit# / total visits:   Cancels/No Shows: 0/0      Subjective:    Pain:  [x] Yes  [] No Location: Rt shoulder/deltoid     Pain Rating: (0-10 scale) 4/10  Pain altered Tx:  [] No  [] Yes  Action:  Comments:  States he worked a partial day yesterday, requested to not lift items into oven (which is high)- so they had him clean- which involved a lot of scrubbing, which did irritate shoulder. He ended up leaving work early due to shoulder pain.  PMHx: [] Unremarkable        [] Diabetes     [] HTN                        [] Pacemaker              [] MI/Heart Problems [] Cancer       [] Arthritis       [x] Other: [place]; left rotator cuff tear              [x] Refer to full medical chart  In EPIC     Past Medical History:   Diagnosis Date    Arteriosclerotic heart disease     Asthma     Long time ago    Cellulitis of right upper limb     Resolved    Elevated prostate specific antigen (PSA)     Resolved    Essential hypertension     H/O colonoscopy     Fiberoptic    Intervertebral disc disorders with radiculopathy, lumbar region     Lesion of ulnar nerve, right upper limb     Resolved    Mixed hyperlipidemia     Personal history of other diseases of the respiratory system     Resolved    Prolonged emergence from general anesthesia     Unspecified fracture of shaft of humerus, right arm, initial encounter for closed fracture     Resolved     Past Surgical History:   Procedure Laterality Date    BACK SURGERY

## 2024-11-11 ENCOUNTER — HOSPITAL ENCOUNTER (OUTPATIENT)
Age: 74
Setting detail: THERAPIES SERIES
Discharge: HOME | End: 2024-11-11
Attending: ORTHOPAEDIC SURGERY
Payer: COMMERCIAL

## 2024-11-11 PROCEDURE — 97110 THERAPEUTIC EXERCISES: CPT

## 2024-11-11 NOTE — FLOWSHEET NOTE
[x] [facility]  Outpatient Rehabilitation & Therapy  [address], [place] [address]    Physical Therapy Daily Treatment Note      Date:  2024  Patient Name:  Cameron Washington    :  1950  MRN: [id number]  Physician: Riccardo Basilio MD                                 Insurance: [employer]- visits BMN  Medical Diagnosis: Rt rotator cuff repair/biceps tenotomy              Rehab Codes: M25.511, m25.611, R53.1  Onset Date: 24                 Next 's appt: 10/16/24  Visit# / total visits:   Cancels/No Shows: 0/0      Subjective:    Pain:  [x] Yes  [] No Location: Rt shoulder/deltoid     Pain Rating: (0-10 scale) 1-2/10  Pain altered Tx:  [] No  [] Yes  Action:  Comments:  a little sore from sleeping on it last night.   PMHx: [] Unremarkable        [] Diabetes     [] HTN                        [] Pacemaker              [] MI/Heart Problems [] Cancer       [] Arthritis       [x] Other: [place]; left rotator cuff tear              [x] Refer to full medical chart  In EPIC     Past Medical History:   Diagnosis Date    Arteriosclerotic heart disease     Asthma     Long time ago    Cellulitis of right upper limb     Resolved    Elevated prostate specific antigen (PSA)     Resolved    Essential hypertension     H/O colonoscopy     Fiberoptic    Intervertebral disc disorders with radiculopathy, lumbar region     Lesion of ulnar nerve, right upper limb     Resolved    Mixed hyperlipidemia     Personal history of other diseases of the respiratory system     Resolved    Prolonged emergence from general anesthesia     Unspecified fracture of shaft of humerus, right arm, initial encounter for closed fracture     Resolved     Past Surgical History:   Procedure Laterality Date    BACK SURGERY      COLONOSCOPY      Fiberoptic    ELBOW SURGERY      KNEE ARTHROSCOPY Right     SHOULDER ARTHROSCOPY Right 2024    Right Shoulder Arthroscopy, Rotator Cuff Repair with Biceps Tenotomy, Open

## 2024-11-14 ENCOUNTER — HOSPITAL ENCOUNTER (OUTPATIENT)
Age: 74
Setting detail: THERAPIES SERIES
Discharge: HOME | End: 2024-11-14
Attending: ORTHOPAEDIC SURGERY
Payer: COMMERCIAL

## 2024-11-14 PROCEDURE — 97016 VASOPNEUMATIC DEVICE THERAPY: CPT

## 2024-11-14 PROCEDURE — 97110 THERAPEUTIC EXERCISES: CPT

## 2024-11-14 NOTE — FLOWSHEET NOTE
Slide at Table Top  - 1 x daily - 7 x weekly - 2 sets - 10 reps  - Standing Shoulder Abduction ROM with Dowel (Mirrored)  - 2 x daily - 7 x weekly - 2 sets - 10 reps       Access Code: [email or web address]  URL: [email or web address]  Date: 09/24/2024  Prepared by: Maik Larson    Exercises  - Standing 'L' Stretch at Counter  - 2 x daily - 7 x weekly - 4 sets - 15 seconds hold  - Standing Shoulder External Rotation Stretch in Doorway (Mirrored)  - 2 x daily - 7 x weekly - 4 sets - 15 seconds hold  - Supine Shoulder Flexion Extension AAROM with Dowel  - 2 x daily - 7 x weekly - 3 sets - 10 reps  - Sleeper Stretch (Mirrored)  - 1 x daily - 7 x weekly - 4 sets - 15 seconds hold    9/27: discussed right shoulder positioning exercise for abd/ER with arm propped on a table for home.     10/4:  added serratus press with cane.  Pt did not want pictures.    10/25: added t-band rows/shld ext.   11/11: added t-band shld add.     Plan: [x] Continue per plan of care.   [] Other:      Treatment Charges: Mins Units   []  Modalities     [x]  Ther Exercise 7:57-8:22 am 25 2   []  Manual Therapy     []  Ther Activities     []  Aquatics     []  Neuromuscular     [x] Vasocompression 825-835 am 10 1   [] Gait Training     [] Dry needling        [] 1 or 2 muscles        [] 3 or more muscles     []  Other     Total Treatment time 35 3     Time In:   0757          Time Out:  0845    Electronically signed by:  Emerald Hernandez PTA

## 2024-11-18 ENCOUNTER — HOSPITAL ENCOUNTER (OUTPATIENT)
Age: 74
Setting detail: THERAPIES SERIES
Discharge: HOME | End: 2024-11-18
Attending: ORTHOPAEDIC SURGERY
Payer: COMMERCIAL

## 2024-11-18 PROCEDURE — 97110 THERAPEUTIC EXERCISES: CPT

## 2024-11-18 PROCEDURE — 97016 VASOPNEUMATIC DEVICE THERAPY: CPT

## 2024-11-18 NOTE — PROGRESS NOTES
[x] [facility]  Outpatient Rehabilitation & Therapy  [address], [place] [address]    Physical Therapy Daily Treatment Note/ Progress note      Date:  2024  Patient Name:  Cameron Washington    :  1950  MRN: [id number]  Physician: Riccardo Basilio MD                                 Insurance: [place]- visits BMN  Medical Diagnosis: Rt rotator cuff repair/biceps tenotomy              Rehab Codes: M25.511, m25.611, R53.1  Onset Date: 24                 Next 's appt: 10/16/24  Visit# / total visits:   Cancels/No Shows: 0/0  Reporting Period: 10/16-    Subjective:    Pain:  [x] Yes  [] No Location: Rt shoulder/deltoid     Pain Rating: (0-10 scale) 2/10  Pain altered Tx:  [] No  [] Yes  Action:  Comments:  Pt reports a little sore this session due to sleeping on the right shoulder.   PMHx: [] Unremarkable        [] Diabetes     [] HTN                        [] Pacemaker              [] MI/Heart Problems [] Cancer       [] Arthritis       [x] Other: [place]; left rotator cuff tear              [x] Refer to full medical chart  In EPIC     Past Medical History:   Diagnosis Date    Arteriosclerotic heart disease     Asthma     Long time ago    Cellulitis of right upper limb     Resolved    Elevated prostate specific antigen (PSA)     Resolved    Essential hypertension     H/O colonoscopy     Fiberoptic    Intervertebral disc disorders with radiculopathy, lumbar region     Lesion of ulnar nerve, right upper limb     Resolved    Mixed hyperlipidemia     Personal history of other diseases of the respiratory system     Resolved    Prolonged emergence from general anesthesia     Unspecified fracture of shaft of humerus, right arm, initial encounter for closed fracture     Resolved     Past Surgical History:   Procedure Laterality Date    BACK SURGERY      COLONOSCOPY      Fiberoptic    ELBOW SURGERY      KNEE ARTHROSCOPY Right     SHOULDER ARTHROSCOPY Right 2024

## 2024-11-21 ENCOUNTER — HOSPITAL ENCOUNTER (OUTPATIENT)
Age: 74
Setting detail: THERAPIES SERIES
Discharge: HOME | End: 2024-11-21
Attending: ORTHOPAEDIC SURGERY
Payer: COMMERCIAL

## 2024-11-21 PROCEDURE — 97110 THERAPEUTIC EXERCISES: CPT

## 2024-11-21 PROCEDURE — 97016 VASOPNEUMATIC DEVICE THERAPY: CPT

## 2024-11-21 NOTE — FLOWSHEET NOTE
[x] [facility]  Outpatient Rehabilitation & Therapy  [address], [place] [address]    Physical Therapy Daily Treatment Note    Date:  2024  Patient Name:  Cameron Washington    :  1950  MRN: [id number]  Physician: Riccardo Basilio MD                                 Insurance: [place]- visits BMN  Medical Diagnosis: Rt rotator cuff repair/biceps tenotomy              Rehab Codes: M25.511, m25.611, R53.1  Onset Date: 24                 Next 's appt: 10/16/24  Visit# / total visits:   Cancels/No Shows: 0/0  Reporting Period: 10/16-    Subjective:    Pain:  [x] Yes  [] No Location: Rt shoulder/deltoid     Pain Rating: (0-10 scale) 3/10  Pain altered Tx:  [] No  [] Yes  Action:  Comments:  Pt reports feeling fatigued at beginning of session d/t starting work on Monday (). Pt states he's still having difficulty with [place] motion.   PMHx: [] Unremarkable        [] Diabetes     [] HTN                        [] Pacemaker              [] MI/Heart Problems [] Cancer       [] Arthritis       [x] Other: [place]; left rotator cuff tear              [x] Refer to full medical chart  In EPIC     Past Medical History:   Diagnosis Date    Arteriosclerotic heart disease     Asthma     Long time ago    Cellulitis of right upper limb     Resolved    Elevated prostate specific antigen (PSA)     Resolved    Essential hypertension     H/O colonoscopy     Fiberoptic    Intervertebral disc disorders with radiculopathy, lumbar region     Lesion of ulnar nerve, right upper limb     Resolved    Mixed hyperlipidemia     Personal history of other diseases of the respiratory system     Resolved    Prolonged emergence from general anesthesia     Unspecified fracture of shaft of humerus, right arm, initial encounter for closed fracture     Resolved     Past Surgical History:   Procedure Laterality Date    BACK SURGERY      COLONOSCOPY      Fiberoptic    ELBOW SURGERY      KNEE ARTHROSCOPY

## 2024-11-25 ENCOUNTER — HOSPITAL ENCOUNTER (OUTPATIENT)
Age: 74
Setting detail: THERAPIES SERIES
Discharge: HOME | End: 2024-11-25
Attending: ORTHOPAEDIC SURGERY
Payer: COMMERCIAL

## 2024-11-25 NOTE — FLOWSHEET NOTE
[] [facility]  Outpatient Rehabilitation & Therapy  [address]   [place], [place] [address]       Physical Therapy Cancel/No Show note    Date: 2024  Patient: Cameron Washington  : 1950  MRN: [id number]      Cancels/No Shows to date:     For today's appointment patient:    [x]  Cancelled    [] Rescheduled appointment    [] No-show     Reason given by patient:    []  Patient ill    []  Conflicting appointment    [] No transportation      [x] Conflict with work    [] No reason given    [] Weather related    [] COVID-19    [] Other:      Comments:      [] Next appointment was confirmed    Electronically signed by: Jessica Cruz PTA

## 2024-12-02 ENCOUNTER — HOSPITAL ENCOUNTER (OUTPATIENT)
Age: 74
Setting detail: THERAPIES SERIES
End: 2024-12-02
Attending: ORTHOPAEDIC SURGERY
Payer: COMMERCIAL

## 2024-12-03 ENCOUNTER — OFFICE VISIT (OUTPATIENT)
Age: 74
End: 2024-12-03

## 2024-12-03 VITALS — WEIGHT: 241 LBS | BODY MASS INDEX: 34.5 KG/M2 | HEIGHT: 70 IN

## 2024-12-03 DIAGNOSIS — Z98.890 S/P ROTATOR CUFF REPAIR: Primary | ICD-10-CM

## 2024-12-03 PROCEDURE — 99024 POSTOP FOLLOW-UP VISIT: CPT

## 2024-12-03 NOTE — PROGRESS NOTES
[facility] Orthopedics & Sports Medicine    [facility] Orthopaedics and Sports Medicine  [address] [place] [address], [address]  [place] [address]    Surgery:    9/5/2024  Right Shoulder Arthroscopy, Rotator Cuff Repair with Biceps Tenotomy, Open Subacromial Decompression, and Labral Debridement - Right    History of Present Illness:    This is a 74 y.o. male who presents to the clinic today for post op follow up for Right Shoulder Arthroscopy, Rotator Cuff Repair with Biceps Tenotomy, Open Subacromial Decompression, and Labral Debridement - Right on 9/5/2024 .  Patient states that he is doing well.  He notes that he is still sore but is improving.  He is been going to physical therapy.  Continue ibuprofen as needed for pain.  He presents today for further evaluation.    Physical Exam:  Right shoulder: Incision sites are well-healed at this point.  No signs of infectious process ongoing.  No erythema around the incision sites.  Patient is able to forward elevate his right shoulder to roughly 160 degrees on exam today.  He has 4-5 strength resisted forward flexion.    Imaging:  None    Plan:  Patient is doing well postoperatively.  I did go over the general course recovery from the surgery.  I explained that it is a 9-month recovery.  I did encourage him to continue at home exercise as well as going to physical therapy.  Certainly if he has any questions or concerns that come up, he can let us know.  I will see him back as needed.  The patient demonstrates understanding and is agreeable to plan.          Electronically signed by MICHAEL SNE PA-C on 12/3/2024 at 8:27 AM    Please note that this chart was generated using voice recognition Dragon dictation software. Although every effort was made to ensure the accuracy of this automated transcription, some errors in transcription may have occurred.

## 2024-12-05 ENCOUNTER — OFFICE VISIT (OUTPATIENT)
Age: 74
End: 2024-12-05

## 2024-12-05 ENCOUNTER — HOSPITAL ENCOUNTER (OUTPATIENT)
Age: 74
Setting detail: THERAPIES SERIES
Discharge: HOME | End: 2024-12-05
Attending: ORTHOPAEDIC SURGERY
Payer: COMMERCIAL

## 2024-12-05 VITALS
HEART RATE: 50 BPM | WEIGHT: 245 LBS | OXYGEN SATURATION: 98 % | DIASTOLIC BLOOD PRESSURE: 84 MMHG | SYSTOLIC BLOOD PRESSURE: 130 MMHG | BODY MASS INDEX: 35.07 KG/M2 | HEIGHT: 70 IN

## 2024-12-05 DIAGNOSIS — I10 ESSENTIAL HYPERTENSION: ICD-10-CM

## 2024-12-05 DIAGNOSIS — Z12.5 PROSTATE CANCER SCREENING: ICD-10-CM

## 2024-12-05 DIAGNOSIS — Z13.220 SCREENING FOR HYPERLIPIDEMIA: Primary | ICD-10-CM

## 2024-12-05 DIAGNOSIS — M12.811 ROTATOR CUFF ARTHROPATHY OF RIGHT SHOULDER: ICD-10-CM

## 2024-12-05 DIAGNOSIS — Z23 ENCOUNTER FOR IMMUNIZATION: ICD-10-CM

## 2024-12-05 DIAGNOSIS — I10 PRIMARY HYPERTENSION: ICD-10-CM

## 2024-12-05 PROCEDURE — 97016 VASOPNEUMATIC DEVICE THERAPY: CPT

## 2024-12-05 PROCEDURE — 97110 THERAPEUTIC EXERCISES: CPT

## 2024-12-05 NOTE — PROGRESS NOTES
[facility]     Date of Visit:  2024  Patient Name: Cameron Washington   Patient :  1950     CHIEF COMPLAINT/HPI:     Cameron Washington is a 74 y.o. male who presents today for an general visit to be evaluated for the following condition(s):  Chief Complaint   Patient presents with    Follow-up     Patient will see cardiology in the near future apparently he is scheduled for heart cath.  He continues to workout on a regular basis also going to physical therapy for recent right shoulder surgery.  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  No fever no sweats no chills. No chest pain nor shortness of breath. No nausea nor vomiting no diarrhea . No  complaints.  No edema issues.      REVIEWED INFORMATION      Allergies   Allergen Reactions    Latex Rash    Alcohol      Sinus problems    Dust Mite Extract     Cephalexin Rash       Current Outpatient Medications   Medication Sig Dispense Refill    tadalafil (CIALIS) 5 MG tablet TAKE 1 TABLET BY MOUTH ONCE DAILY AS NEEDED 90 tablet 0    celecoxib (CELEBREX) 100 MG capsule TAKE 1 CAPSULE TWICE A DAY WITH FOOD 180 capsule 3    ibuprofen (ADVIL;MOTRIN) 800 MG tablet Take 1 tablet by mouth every 8 hours as needed for Pain 90 tablet 0    amLODIPine (NORVASC) 5 MG tablet Take 1 tablet by mouth daily 30 tablet 3    fluticasone (FLONASE) 50 MCG/ACT nasal spray 2 sprays by Each Nostril route daily 16 g 0    aspirin 81 MG tablet Take 1 tablet by mouth daily      valsartan (DIOVAN) 160 MG tablet Take 1 tablet by mouth daily (Patient not taking: Reported on 10/17/2024)       No current facility-administered medications for this visit.        Patient Active Problem List   Diagnosis    Essential hypertension    Mixed hyperlipidemia    SOB (shortness of breath)    Murmur    Bradycardia by electrocardiogram    Bronchitis    Hypertension    Hypercholesterolemia    Impotence

## 2024-12-05 NOTE — PROGRESS NOTES
Vaccine Information Sheet, \"Influenza - Inactivated\"  given to Cameron Washington, or parent/legal guardian of  Cameron Washington and verbalized understanding.    Patient responses:    Have you ever had a reaction to a flu vaccine? No  Are you able to eat eggs without adverse effects?  Yes  Do you have any current illness?  No  Have you ever had [place] Syndrome?  No    Flu vaccine given per order. Please see immunization tab.

## 2024-12-05 NOTE — PROGRESS NOTES
After obtaining consent, and per orders of Dr. martin, injection of fluad given in Left deltoid by Maximus Machado MA. Patient tolerated the injection well.

## 2024-12-05 NOTE — FLOWSHEET NOTE
[x] [facility]  Outpatient Rehabilitation & Therapy  [address], [place] [address]    Physical Therapy Daily Treatment Note    Date:  2024  Patient Name:  Cameron Washington    :  1950  MRN: [id number]  Physician: Riccardo Basilio MD                                 Insurance: [place]- visits BMN  Medical Diagnosis: Rt rotator cuff repair/biceps tenotomy              Rehab Codes: M25.511, m25.611, R53.1  Onset Date: 24                 Next 's appt: 10/16/24  Visit# / total visits:   Cancels/No Shows: 0/0  Reporting Period: 10/16-    Subjective:    Pain:  [x] Yes  [] No Location: Rt shoulder/deltoid     Pain Rating: (0-10 scale) 3/10  Pain altered Tx:  [] No  [] Yes  Action:  Comments: States he has been using UE alot more, so he is still sore.      PMHx: [] Unremarkable        [] Diabetes     [] HTN                        [] Pacemaker              [] MI/Heart Problems [] Cancer       [] Arthritis       [x] Other: [place]; left rotator cuff tear              [x] Refer to full medical chart  In EPIC     Past Medical History:   Diagnosis Date    Arteriosclerotic heart disease     Asthma     Long time ago    Cellulitis of right upper limb     Resolved    Elevated prostate specific antigen (PSA)     Resolved    Essential hypertension     H/O colonoscopy     Fiberoptic    Intervertebral disc disorders with radiculopathy, lumbar region     Lesion of ulnar nerve, right upper limb     Resolved    Mixed hyperlipidemia     Personal history of other diseases of the respiratory system     Resolved    Prolonged emergence from general anesthesia     Unspecified fracture of shaft of humerus, right arm, initial encounter for closed fracture     Resolved     Past Surgical History:   Procedure Laterality Date    BACK SURGERY      COLONOSCOPY      Fiberoptic    ELBOW SURGERY      KNEE ARTHROSCOPY Right     SHOULDER ARTHROSCOPY Right 2024    Right Shoulder Arthroscopy, Rotator

## 2024-12-10 ENCOUNTER — HOSPITAL ENCOUNTER (OUTPATIENT)
Age: 74
Setting detail: THERAPIES SERIES
Discharge: HOME | End: 2024-12-10
Attending: ORTHOPAEDIC SURGERY
Payer: COMMERCIAL

## 2024-12-10 ENCOUNTER — APPOINTMENT (OUTPATIENT)
Age: 74
End: 2024-12-10
Attending: ORTHOPAEDIC SURGERY
Payer: COMMERCIAL

## 2024-12-10 NOTE — FLOWSHEET NOTE
[] [facility]  Outpatient Rehabilitation & Therapy  [address]   [place], [place] [address]       Physical Therapy Cancel/No Show note    Date: 12/10/2024  Patient: Cameron Washington  : 1950  MRN: [id number]      Cancels/No Shows to date: 3/0    For today's appointment patient:    [x]  Cancelled    [] Rescheduled appointment    [] No-show     Reason given by patient:    []  Patient ill    []  Conflicting appointment    [] No transportation      [x] Conflict with work    [] No reason given    [] Weather related    [] COVID-19    [] Other:      Comments:      [x] Next appointment was confirmed (rescheduled for tomorrow)    Electronically signed by: Maik aLrson PT

## 2024-12-11 ENCOUNTER — HOSPITAL ENCOUNTER (OUTPATIENT)
Age: 74
Setting detail: THERAPIES SERIES
Discharge: HOME | End: 2024-12-11
Attending: ORTHOPAEDIC SURGERY
Payer: COMMERCIAL

## 2024-12-11 PROCEDURE — 97110 THERAPEUTIC EXERCISES: CPT

## 2024-12-11 NOTE — FLOWSHEET NOTE
[x] [facility]  Outpatient Rehabilitation & Therapy  [address] [place], [place] [address]    Physical Therapy Daily Treatment Note    Date:  2024  Patient Name:  Cameron Washington    :  1950  MRN: [id number]  Physician: Riccardo Basilio MD                                 Insurance: [place]- visits BMN  Medical Diagnosis: Rt rotator cuff repair/biceps tenotomy              Rehab Codes: M25.511, m25.611, R53.1  Onset Date: 24                 Next 's appt: 10/16/24  Visit# / total visits:   Cancels/No Shows: 0/0  Reporting Period: 10/16-    Subjective:    Pain:  [x] Yes  [] No Location: Rt shoulder/deltoid     Pain Rating: (0-10 scale) 2/10  Pain altered Tx:  [] No  [] Yes  Action:  Comments: Patient reports minimal soreness today- although he blames this on sleeping on right side last night    PMHx: [] Unremarkable        [] Diabetes     [] HTN                        [] Pacemaker              [] MI/Heart Problems [] Cancer       [] Arthritis       [x] Other: [place]; left rotator cuff tear              [x] Refer to full medical chart  In EPIC     Past Medical History:   Diagnosis Date    Arteriosclerotic heart disease     Asthma     Long time ago    Cellulitis of right upper limb     Resolved    Elevated prostate specific antigen (PSA)     Resolved    Essential hypertension     H/O colonoscopy     Fiberoptic    Intervertebral disc disorders with radiculopathy, lumbar region     Lesion of ulnar nerve, right upper limb     Resolved    Mixed hyperlipidemia     Personal history of other diseases of the respiratory system     Resolved    Prolonged emergence from general anesthesia     Unspecified fracture of shaft of humerus, right arm, initial encounter for closed fracture     Resolved     Past Surgical History:   Procedure Laterality Date    BACK SURGERY      COLONOSCOPY      Fiberoptic    ELBOW SURGERY      KNEE ARTHROSCOPY Right     SHOULDER ARTHROSCOPY Right 2024

## 2024-12-16 ENCOUNTER — APPOINTMENT (OUTPATIENT)
Age: 74
End: 2024-12-16
Attending: ORTHOPAEDIC SURGERY
Payer: COMMERCIAL

## 2024-12-17 ENCOUNTER — APPOINTMENT (OUTPATIENT)
Age: 74
End: 2024-12-17
Attending: ORTHOPAEDIC SURGERY
Payer: COMMERCIAL

## 2024-12-17 ENCOUNTER — HOSPITAL ENCOUNTER (OUTPATIENT)
Age: 74
Setting detail: THERAPIES SERIES
Discharge: HOME | End: 2024-12-17
Attending: ORTHOPAEDIC SURGERY
Payer: COMMERCIAL

## 2024-12-17 PROCEDURE — 97110 THERAPEUTIC EXERCISES: CPT

## 2024-12-17 NOTE — FLOWSHEET NOTE
COLONOSCOPY      Fiberoptic    ELBOW SURGERY      KNEE ARTHROSCOPY Right     SHOULDER ARTHROSCOPY Right 9/5/2024    Right Shoulder Arthroscopy, Rotator Cuff Repair with Biceps Tenotomy, Open Subacromial Decompression, and Labral Debridement performed by Riccardo Basilio MD at [facility] OR    VENTRAL HERNIA REPAIR           Objective:      Objective: At progress note completed 11/18                  ROM measured passively  ROM  A/P END FEEL STRENGTH TESTS (+/-) Left Right Not Tested     Left Right   Left Right Drop Arm     []    Sit Shld Flex           Sulcus Sign     []    Sit Shld Abd           Apprehension     []    Sit Shld IR           Yergasons     []    Shoulder Flex 148 148     4+ Speeds     []    Ext           Neer     []     125     4 Pereira      []    ER @ 0  70 42     3+ Painful Arc     []    IR T6 90     4 Tinel     []    Supraspinatus                     Infraspinatus                     Serratus Ant                     Pectoralis                     Lats                     Mid Trap                     Lower Trap                     Elbow Flex.   np                 Elbow Ext.   np                 Pronation                     Supination                      48 lbs 44 lbs (not measured today)                          Functional Test: SPADI Score: Raw score of 65/130,   which is 50% functionally impaired (initially); 31/130: 24% impaired (currently); on 11/18 scored 63/130 or 48% functionally impaired.       Comments:     10/9- no significant swelling / abnormalities palpated in right UE    Modalities: Vasocompression x10'  Precautions: right Humerus Fx in past with impaired elbow movement and some nerve damage affecting fingers  Exercises:   Exercise Reps/ Time Weight/ Level Comments   Pulleys  3'       4-way pendulum 15x NP    at home            Biceps curls 20x    3#   Standing cane abd/ER 15x each        T-band: row/shld ext/add 20x ea.   green  added 10/25   ER/IR  X15 each Green

## 2024-12-17 NOTE — PROGRESS NOTES
Pre-procedure phone call completed. Reviewed arrival time  0730 , Entrance C and NPO. Confirmed that patient has an adult  and that they have someone available to stay with them if they are discharged to home afterward. Patient asked to bring a list of home medications. Patient verbalizes understanding.

## 2024-12-18 ENCOUNTER — HOSPITAL ENCOUNTER (OUTPATIENT)
Age: 74
Setting detail: OUTPATIENT SURGERY
Discharge: HOME | End: 2024-12-18
Attending: INTERNAL MEDICINE | Admitting: INTERNAL MEDICINE
Payer: COMMERCIAL

## 2024-12-18 ENCOUNTER — APPOINTMENT (OUTPATIENT)
Age: 74
End: 2024-12-18
Attending: ORTHOPAEDIC SURGERY
Payer: COMMERCIAL

## 2024-12-18 VITALS
TEMPERATURE: 97.5 F | HEART RATE: 46 BPM | SYSTOLIC BLOOD PRESSURE: 180 MMHG | WEIGHT: 244 LBS | OXYGEN SATURATION: 98 % | HEIGHT: 70 IN | DIASTOLIC BLOOD PRESSURE: 90 MMHG | RESPIRATION RATE: 16 BRPM | BODY MASS INDEX: 34.93 KG/M2

## 2024-12-18 DIAGNOSIS — I10 HYPERTENSION: ICD-10-CM

## 2024-12-18 DIAGNOSIS — R06.02 SHORTNESS OF BREATH: ICD-10-CM

## 2024-12-18 DIAGNOSIS — I25.10 CAD (CORONARY ARTERY DISEASE): ICD-10-CM

## 2024-12-18 DIAGNOSIS — R94.39 ABNORMAL STRESS TEST: Primary | ICD-10-CM

## 2024-12-18 LAB
BUN BLD-MCNC: 19 MG/DL (ref 8–26)
CA-I BLD-SCNC: 1.21 MMOL/L (ref 1.15–1.33)
CHLORIDE BLD-SCNC: 103 MMOL/L (ref 98–107)
CO2 BLD CALC-SCNC: 28 MMOL/L (ref 22–30)
ECHO BSA: 2.34 M2
EGFR, POC: 90 ML/MIN/1.73M2
GLUCOSE BLD-MCNC: 118 MG/DL (ref 74–100)
HCT VFR BLD AUTO: 44 % (ref 41–53)
PLATELET # BLD AUTO: 271 K/UL (ref 140–450)
POC ANION GAP: 13 MMOL/L (ref 7–16)
POC CREATININE: 0.9 MG/DL (ref 0.51–1.19)
POC HEMOGLOBIN (CALC): 15.1 G/DL (ref 13.5–17.5)
POTASSIUM BLD-SCNC: 4.4 MMOL/L (ref 3.5–4.5)
SODIUM BLD-SCNC: 143 MMOL/L (ref 138–146)

## 2024-12-18 PROCEDURE — 82330 ASSAY OF CALCIUM: CPT

## 2024-12-18 PROCEDURE — 85014 HEMATOCRIT: CPT

## 2024-12-18 PROCEDURE — 7100000010 HC PHASE II RECOVERY - FIRST 15 MIN: Performed by: INTERNAL MEDICINE

## 2024-12-18 PROCEDURE — 99152 MOD SED SAME PHYS/QHP 5/>YRS: CPT | Performed by: INTERNAL MEDICINE

## 2024-12-18 PROCEDURE — 6360000002 HC RX W HCPCS: Performed by: INTERNAL MEDICINE

## 2024-12-18 PROCEDURE — 84520 ASSAY OF UREA NITROGEN: CPT

## 2024-12-18 PROCEDURE — 6360000004 HC RX CONTRAST MEDICATION: Performed by: INTERNAL MEDICINE

## 2024-12-18 PROCEDURE — 7100000011 HC PHASE II RECOVERY - ADDTL 15 MIN: Performed by: INTERNAL MEDICINE

## 2024-12-18 PROCEDURE — 85049 AUTOMATED PLATELET COUNT: CPT

## 2024-12-18 PROCEDURE — 2500000003 HC RX 250 WO HCPCS: Performed by: INTERNAL MEDICINE

## 2024-12-18 PROCEDURE — 93458 L HRT ARTERY/VENTRICLE ANGIO: CPT | Performed by: INTERNAL MEDICINE

## 2024-12-18 PROCEDURE — 82565 ASSAY OF CREATININE: CPT

## 2024-12-18 PROCEDURE — 2709999900 HC NON-CHARGEABLE SUPPLY: Performed by: INTERNAL MEDICINE

## 2024-12-18 PROCEDURE — 82947 ASSAY GLUCOSE BLOOD QUANT: CPT

## 2024-12-18 PROCEDURE — C1894 INTRO/SHEATH, NON-LASER: HCPCS | Performed by: INTERNAL MEDICINE

## 2024-12-18 PROCEDURE — 80051 ELECTROLYTE PANEL: CPT

## 2024-12-18 PROCEDURE — 99221 1ST HOSP IP/OBS SF/LOW 40: CPT | Performed by: INTERNAL MEDICINE

## 2024-12-18 RX ORDER — SODIUM CHLORIDE 9 MG/ML
INJECTION, SOLUTION INTRAVENOUS PRN
Status: DISCONTINUED | OUTPATIENT
Start: 2024-12-18 | End: 2024-12-18 | Stop reason: HOSPADM

## 2024-12-18 RX ORDER — SODIUM CHLORIDE 0.9 % (FLUSH) 0.9 %
5-40 SYRINGE (ML) INJECTION PRN
Status: DISCONTINUED | OUTPATIENT
Start: 2024-12-18 | End: 2024-12-18 | Stop reason: HOSPADM

## 2024-12-18 RX ORDER — SODIUM CHLORIDE 0.9 % (FLUSH) 0.9 %
5-40 SYRINGE (ML) INJECTION EVERY 12 HOURS SCHEDULED
Status: DISCONTINUED | OUTPATIENT
Start: 2024-12-18 | End: 2024-12-18 | Stop reason: HOSPADM

## 2024-12-18 RX ORDER — LIDOCAINE HYDROCHLORIDE 10 MG/ML
INJECTION, SOLUTION INFILTRATION; PERINEURAL PRN
Status: DISCONTINUED | OUTPATIENT
Start: 2024-12-18 | End: 2024-12-18 | Stop reason: HOSPADM

## 2024-12-18 RX ORDER — MIDAZOLAM 1 MG/ML
INJECTION INTRAMUSCULAR; INTRAVENOUS PRN
Status: DISCONTINUED | OUTPATIENT
Start: 2024-12-18 | End: 2024-12-18 | Stop reason: HOSPADM

## 2024-12-18 RX ORDER — ATORVASTATIN CALCIUM 20 MG/1
20 TABLET, FILM COATED ORAL DAILY
Qty: 90 TABLET | Refills: 1 | Status: SHIPPED | OUTPATIENT
Start: 2024-12-18

## 2024-12-18 RX ORDER — ACETAMINOPHEN 325 MG/1
650 TABLET ORAL EVERY 4 HOURS PRN
Status: DISCONTINUED | OUTPATIENT
Start: 2024-12-18 | End: 2024-12-18 | Stop reason: HOSPADM

## 2024-12-18 RX ORDER — IOPAMIDOL 755 MG/ML
INJECTION, SOLUTION INTRAVASCULAR PRN
Status: DISCONTINUED | OUTPATIENT
Start: 2024-12-18 | End: 2024-12-18 | Stop reason: HOSPADM

## 2024-12-18 RX ADMIN — SODIUM CHLORIDE, PRESERVATIVE FREE 10 ML: 5 INJECTION INTRAVENOUS at 08:21

## 2024-12-18 NOTE — PROGRESS NOTES
Patient received post procedure to pacu 12. Assessment obtained.  Post cath pathway initiated. Right wrist site with vascband intact. No hematoma noted. Restrictions reviewed with patient and wife Chanelle.  Patient without complaints.

## 2024-12-18 NOTE — PROGRESS NOTES
Patient admitted, consent signed and questions answered. Patient ready for procedure. Call light to reach with side rails up 2 of 2. B/L groin clipped with writer and MAXWELL Kruse present.  Chanelle, wife at bedside with patient.  History and physical needs to be completed.
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
## 2024-12-20 RX ORDER — VALSARTAN 160 MG/1
160 TABLET ORAL DAILY
Qty: 90 TABLET | Refills: 3 | Status: SHIPPED | OUTPATIENT
Start: 2024-12-20

## 2024-12-20 RX ORDER — VALSARTAN 160 MG/1
TABLET ORAL
Refills: 0 | OUTPATIENT
Start: 2024-12-20

## 2024-12-24 ENCOUNTER — HOSPITAL ENCOUNTER (OUTPATIENT)
Age: 74
Setting detail: THERAPIES SERIES
Discharge: HOME | End: 2024-12-24
Attending: ORTHOPAEDIC SURGERY
Payer: COMMERCIAL

## 2024-12-24 NOTE — FLOWSHEET NOTE
[] [facility]  Outpatient Rehabilitation & Therapy  [address]   [place], [place] [address]       Physical Therapy Cancel/No Show note    Date: 2024  Patient: Cameron Washington  : 1950  MRN: [id number]    Visit Count:   Cancels/No Shows to date:     For today's appointment patient:    []  Cancelled    [] Rescheduled appointment    [] No-show     Reason given by patient:    []  Patient ill    []  Conflicting appointment    [] No transportation      [x] Conflict with work    [] No reason given    [] Weather related    [] COVID-19    [] Other:      Comments:        [x] Next appointment was confirmed    Electronically signed by: Maik Larson PT

## 2024-12-27 ENCOUNTER — HOSPITAL ENCOUNTER (OUTPATIENT)
Age: 74
Setting detail: THERAPIES SERIES
Discharge: HOME | End: 2024-12-27
Attending: ORTHOPAEDIC SURGERY
Payer: COMMERCIAL

## 2024-12-27 PROCEDURE — 97110 THERAPEUTIC EXERCISES: CPT

## 2024-12-27 NOTE — FLOWSHEET NOTE
reaching overhead/behind his back, lifting/carrying, bathing, grooming and yardwork/chores.  11/18: progressing (see objective)  ? Strength: Rt UE to 5/5 to improve reaching overhead/behind his back, lifting/carrying, bathing, grooming and yardwork/chores.  11/18: progressing (see objective)  ? Function: SPADI to 15/130 11/18: progressing (see objective)  Patient to be independent with home exercise program as demonstrated by performance with correct form without cues. 11/18: progressing     LTG: (to be met in 36 treatments)  Patient will return to normal function including reaching overhead/behind his back, lifting/carrying, bathing, grooming and yardwork/chores with minimal pain/difficulty. 11/18: progressing                    Patient goals:  \"keep arm from binding up\"      Pt. Education:  [x] Plans/Goals, Risks/Benefits discussed  [x] Home exercise program  Method of Education: [x] Verbal  [x] Demo  [] Written  Comprehension of Education:  [] Verbalizes understanding.  [] Demonstrates understanding.  [x] Needs Review.  [] Demonstrates/verbalizes understanding of HEP/Ed previously given.  Access Code: [email or web address]  URL: [email or web address]  Date: 09/20/2024  Prepared by: Maik Larson     Exercises  - Circular Shoulder Pendulum with Table Support  - 3 x daily - 7 x weekly - 2 sets - 10 reps  - Flexion-Extension Shoulder Pendulum with Table Support  - 3 x daily - 7 x weekly - 2 sets - 10 reps  - Horizontal Shoulder Pendulum with Table Support  - 3 x daily - 7 x weekly - 2 sets - 10 reps  - Standing Shoulder Shrugs  - 2 x daily - 7 x weekly - 2 sets - 10 reps  - Seated Scapular Retraction  - 2 x daily - 7 x weekly - 2 sets - 10 reps  - Standing Elbow Flexion Extension AROM  - 2 x daily - 7 x weekly - 2 sets - 10 reps  - Seated Shoulder Flexion Towel Slide at Table Top  - 2 x daily - 7 x weekly - 2 sets - 10 reps  - Seated Shoulder Abduction Towel Slide at Table Top  - 1 x daily - 7 x weekly - 2 sets - 10  (V5) oriented

## 2024-12-31 ENCOUNTER — HOSPITAL ENCOUNTER (OUTPATIENT)
Age: 74
Setting detail: THERAPIES SERIES
Discharge: HOME | End: 2024-12-31
Attending: ORTHOPAEDIC SURGERY
Payer: COMMERCIAL

## 2024-12-31 PROCEDURE — 97110 THERAPEUTIC EXERCISES: CPT

## 2024-12-31 NOTE — FLOWSHEET NOTE
[x] [facility]  Outpatient Rehabilitation & Therapy  [address], [place] [address]    Physical Therapy Daily Treatment Note    Date:  2024  Patient Name:  Cameron Washington    :  1950  MRN: [id number]  Physician: Riccardo Basilio MD                                 Insurance: [place]- visits BMN  Medical Diagnosis: Rt rotator cuff repair/biceps tenotomy              Rehab Codes: M25.511, m25.611, R53.1  Onset Date: 24                 Next 's appt: 10/16/24  Visit# / total visits:   Cancels/No Shows: 0/0  Reporting Period: 10/16-    Subjective:    Pain:  [x] Yes  [] No Location: Rt shoulder/deltoid     Pain Rating: (0-10 scale) 0/10  Pain altered Tx:  [] No  [] Yes  Action:  Comments: Patient stated he feels fine this am. Currently denies shoulder pain. Pt requested to be D/C'd after today's session.  PMHx: [] Unremarkable        [] Diabetes     [] HTN                        [] Pacemaker              [] MI/Heart Problems [] Cancer       [] Arthritis       [x] Other: [place] ([place] left rotator cuff tear              [x] Refer to full medical chart  In EPIC     Past Medical History:   Diagnosis Date    Arteriosclerotic heart disease     Asthma     Long time ago    Cellulitis of right upper limb     Resolved    Elevated prostate specific antigen (PSA)     Resolved    Essential hypertension     H/O colonoscopy     Fiberoptic    Intervertebral disc disorders with radiculopathy, lumbar region     Lesion of ulnar nerve, right upper limb     Resolved    Mixed hyperlipidemia     Personal history of other diseases of the respiratory system     Resolved    Prolonged emergence from general anesthesia     Unspecified fracture of shaft of humerus, right arm, initial encounter for closed fracture     Resolved     Past Surgical History:   Procedure Laterality Date    BACK SURGERY      CARDIAC PROCEDURE N/A 2024    Left heart cath / coronary angiography performed by Rich

## 2025-01-06 RX ORDER — TADALAFIL 5 MG/1
TABLET ORAL
Qty: 90 TABLET | Refills: 0 | Status: SHIPPED | OUTPATIENT
Start: 2025-01-06

## 2025-01-06 NOTE — TELEPHONE ENCOUNTER
Cameron Washington is calling to request a refill on the following medication(s):    Medication Request:  Requested Prescriptions     Pending Prescriptions Disp Refills    tadalafil (CIALIS) 5 MG tablet [Pharmacy Med Name: Tadalafil 5 MG Oral Tablet] 90 tablet 0     Sig: TAKE 1 TABLET BY MOUTH ONCE DAILY AS NEEDED       Last Visit Date (If Applicable):  12/5/2024    Next Visit Date:    Visit date not found

## 2025-04-08 RX ORDER — TADALAFIL 5 MG/1
TABLET ORAL
Qty: 90 TABLET | Refills: 0 | Status: SHIPPED | OUTPATIENT
Start: 2025-04-08

## 2025-04-08 NOTE — TELEPHONE ENCOUNTER
Cameron Washington is calling to request a refill on the following medication(s):    Medication Request:  Requested Prescriptions     Pending Prescriptions Disp Refills    tadalafil (CIALIS) 5 MG tablet [Pharmacy Med Name: Tadalafil 5 MG Oral Tablet] 90 tablet 0     Sig: TAKE 1 TABLET BY MOUTH ONCE DAILY AS NEEDED       Last Visit Date (If Applicable):  12/5/2024    Next Visit Date:    Visit date not found               Warm/Dry

## 2025-04-21 ENCOUNTER — OFFICE VISIT (OUTPATIENT)
Age: 75
End: 2025-04-21
Payer: COMMERCIAL

## 2025-04-21 VITALS
SYSTOLIC BLOOD PRESSURE: 164 MMHG | WEIGHT: 247.6 LBS | HEART RATE: 61 BPM | DIASTOLIC BLOOD PRESSURE: 90 MMHG | TEMPERATURE: 98.7 F | BODY MASS INDEX: 35.53 KG/M2 | OXYGEN SATURATION: 95 %

## 2025-04-21 DIAGNOSIS — J01.00 ACUTE NON-RECURRENT MAXILLARY SINUSITIS: Primary | ICD-10-CM

## 2025-04-21 PROCEDURE — 3080F DIAST BP >= 90 MM HG: CPT | Performed by: FAMILY MEDICINE

## 2025-04-21 PROCEDURE — 1123F ACP DISCUSS/DSCN MKR DOCD: CPT | Performed by: FAMILY MEDICINE

## 2025-04-21 PROCEDURE — 3077F SYST BP >= 140 MM HG: CPT | Performed by: FAMILY MEDICINE

## 2025-04-21 PROCEDURE — 99213 OFFICE O/P EST LOW 20 MIN: CPT | Performed by: FAMILY MEDICINE

## 2025-04-21 RX ORDER — PREDNISONE 20 MG/1
TABLET ORAL
Qty: 18 TABLET | Refills: 0 | Status: SHIPPED | OUTPATIENT
Start: 2025-04-21

## 2025-04-21 RX ORDER — DOXYCYCLINE HYCLATE 100 MG
100 TABLET ORAL 2 TIMES DAILY
Qty: 20 TABLET | Refills: 0 | Status: SHIPPED | OUTPATIENT
Start: 2025-04-21 | End: 2025-05-01

## 2025-04-21 SDOH — ECONOMIC STABILITY: FOOD INSECURITY: WITHIN THE PAST 12 MONTHS, THE FOOD YOU BOUGHT JUST DIDN'T LAST AND YOU DIDN'T HAVE MONEY TO GET MORE.: NEVER TRUE

## 2025-04-21 SDOH — ECONOMIC STABILITY: FOOD INSECURITY: WITHIN THE PAST 12 MONTHS, YOU WORRIED THAT YOUR FOOD WOULD RUN OUT BEFORE YOU GOT MONEY TO BUY MORE.: NEVER TRUE

## 2025-04-21 ASSESSMENT — PATIENT HEALTH QUESTIONNAIRE - PHQ9
SUM OF ALL RESPONSES TO PHQ QUESTIONS 1-9: 0
2. FEELING DOWN, DEPRESSED OR HOPELESS: NOT AT ALL
1. LITTLE INTEREST OR PLEASURE IN DOING THINGS: NOT AT ALL
SUM OF ALL RESPONSES TO PHQ QUESTIONS 1-9: 0

## 2025-04-21 ASSESSMENT — ENCOUNTER SYMPTOMS
CHEST TIGHTNESS: 0
SHORTNESS OF BREATH: 0

## 2025-04-21 NOTE — PROGRESS NOTES
[facility]  [facility]  [address]  [place] [address]  Dept: [phone]     Date of Visit:  2025  Patient Name: Cameron Washington   Patient :  1950     CHIEF COMPLAINT/HPI:     Cameron Washington is a 74 y.o. male who presents today for an general visit to be evaluated for the following condition(s):  Chief Complaint   Patient presents with    Sinus Problem     Sinus congestion x past 3 weeks.  Has an abscessed tooth right lower tooth, ATB per dentist.  Ears feel full.   R eye is feeling full.  R side is plugged up.  He has an abscessed and has finished augmentin.  Is scheduled May 7th for extraction.      REVIEW OF SYSTEM      Review of Systems   Respiratory:  Negative for chest tightness and shortness of breath.    Cardiovascular:  Negative for chest pain.         REVIEWED INFORMATION      Allergies   Allergen Reactions    Latex Rash    Alcohol      Sinus problems    Dust Mite Extract     Cephalexin Rash       Current Outpatient Medications   Medication Sig Dispense Refill    doxycycline hyclate (VIBRA-TABS) 100 MG tablet Take 1 tablet by mouth 2 times daily for 10 days 20 tablet 0    predniSONE (DELTASONE) 20 MG tablet 60 mg X 3 days then 40 mg X 3 days then 20 mg X 3 days then off; take with food 18 tablet 0    tadalafil (CIALIS) 5 MG tablet TAKE 1 TABLET BY MOUTH ONCE DAILY AS NEEDED 90 tablet 0    valsartan (DIOVAN) 160 MG tablet Take 1 tablet by mouth daily 90 tablet 3    atorvastatin (LIPITOR) 20 MG tablet Take 1 tablet by mouth daily 90 tablet 1    fluticasone (FLONASE) 50 MCG/ACT nasal spray 2 sprays by Each Nostril route daily 16 g 0    aspirin 81 MG tablet Take 1 tablet by mouth daily      ibuprofen (ADVIL;MOTRIN) 800 MG tablet Take 1 tablet by mouth every 8 hours as needed for Pain 90 tablet 0     No current facility-administered medications for this visit.        Patient Active Problem List   Diagnosis    Essential

## 2025-06-05 ENCOUNTER — APPOINTMENT (OUTPATIENT)
Dept: VASCULAR LAB | Age: 75
End: 2025-06-05
Payer: COMMERCIAL

## 2025-06-05 ENCOUNTER — HOSPITAL ENCOUNTER (EMERGENCY)
Age: 75
Discharge: HOME | End: 2025-06-05
Attending: EMERGENCY MEDICINE
Payer: COMMERCIAL

## 2025-06-05 ENCOUNTER — OFFICE VISIT (OUTPATIENT)
Age: 75
End: 2025-06-05

## 2025-06-05 VITALS
WEIGHT: 254 LBS | HEIGHT: 70 IN | HEART RATE: 47 BPM | TEMPERATURE: 97.5 F | RESPIRATION RATE: 16 BRPM | SYSTOLIC BLOOD PRESSURE: 193 MMHG | DIASTOLIC BLOOD PRESSURE: 81 MMHG | OXYGEN SATURATION: 97 % | BODY MASS INDEX: 36.36 KG/M2

## 2025-06-05 VITALS
RESPIRATION RATE: 12 BRPM | DIASTOLIC BLOOD PRESSURE: 80 MMHG | HEIGHT: 70 IN | SYSTOLIC BLOOD PRESSURE: 120 MMHG | OXYGEN SATURATION: 95 % | BODY MASS INDEX: 36.36 KG/M2 | HEART RATE: 48 BPM | WEIGHT: 254 LBS

## 2025-06-05 DIAGNOSIS — M79.89 PAIN AND SWELLING OF RIGHT LOWER EXTREMITY: Primary | ICD-10-CM

## 2025-06-05 DIAGNOSIS — L30.4 INTERTRIGO: ICD-10-CM

## 2025-06-05 DIAGNOSIS — M79.604 PAIN AND SWELLING OF RIGHT LOWER EXTREMITY: Primary | ICD-10-CM

## 2025-06-05 DIAGNOSIS — M79.89 LEG SWELLING: Primary | ICD-10-CM

## 2025-06-05 LAB
ANION GAP SERPL CALCULATED.3IONS-SCNC: 10 MMOL/L (ref 9–16)
BASOPHILS # BLD: 0 K/UL (ref 0–0.2)
BASOPHILS NFR BLD: 0 % (ref 0–2)
BNP SERPL-MCNC: 128 PG/ML (ref 0–125)
BUN SERPL-MCNC: 17 MG/DL (ref 8–23)
CALCIUM SERPL-MCNC: 8.7 MG/DL (ref 8.6–10.4)
CHLORIDE SERPL-SCNC: 106 MMOL/L (ref 98–107)
CO2 SERPL-SCNC: 25 MMOL/L (ref 20–31)
CREAT SERPL-MCNC: 0.9 MG/DL (ref 0.7–1.2)
EOSINOPHIL # BLD: 0.1 K/UL (ref 0–0.4)
EOSINOPHILS RELATIVE PERCENT: 2 % (ref 1–4)
ERYTHROCYTE [DISTWIDTH] IN BLOOD BY AUTOMATED COUNT: 14.3 % (ref 12.5–15.4)
GFR, ESTIMATED: 90 ML/MIN/1.73M2
GLUCOSE SERPL-MCNC: 97 MG/DL (ref 74–99)
HCT VFR BLD AUTO: 43 % (ref 41–53)
HGB BLD-MCNC: 14.1 G/DL (ref 13.5–17.5)
INR PPP: 1 (ref 0.9–1.2)
LYMPHOCYTES NFR BLD: 2.3 K/UL (ref 1–4.8)
LYMPHOCYTES RELATIVE PERCENT: 34 % (ref 24–44)
MCH RBC QN AUTO: 29.7 PG (ref 26–34)
MCHC RBC AUTO-ENTMCNC: 32.8 G/DL (ref 31–37)
MCV RBC AUTO: 90.7 FL (ref 80–100)
MONOCYTES NFR BLD: 0.6 K/UL (ref 0.1–1.2)
MONOCYTES NFR BLD: 9 % (ref 2–11)
NEUTROPHILS NFR BLD: 55 % (ref 36–66)
NEUTS SEG NFR BLD: 3.7 K/UL (ref 1.8–7.7)
PLATELET # BLD AUTO: 229 K/UL (ref 140–450)
PMV BLD AUTO: 8.1 FL (ref 6–12)
POTASSIUM SERPL-SCNC: 3.8 MMOL/L (ref 3.7–5.3)
PROTHROMBIN TIME: 13.5 SEC (ref 11.8–14.6)
RBC # BLD AUTO: 4.75 M/UL (ref 4.5–5.9)
SODIUM SERPL-SCNC: 141 MMOL/L (ref 136–145)
WBC OTHER # BLD: 6.8 K/UL (ref 3.5–11)

## 2025-06-05 PROCEDURE — 85025 COMPLETE CBC W/AUTO DIFF WBC: CPT

## 2025-06-05 PROCEDURE — 93970 EXTREMITY STUDY: CPT

## 2025-06-05 PROCEDURE — 83880 ASSAY OF NATRIURETIC PEPTIDE: CPT

## 2025-06-05 PROCEDURE — 99284 EMERGENCY DEPT VISIT MOD MDM: CPT

## 2025-06-05 PROCEDURE — 80048 BASIC METABOLIC PNL TOTAL CA: CPT

## 2025-06-05 PROCEDURE — 85610 PROTHROMBIN TIME: CPT

## 2025-06-05 RX ORDER — CLOTRIMAZOLE AND BETAMETHASONE DIPROPIONATE 10; .64 MG/G; MG/G
CREAM TOPICAL
Qty: 45 G | Refills: 0 | Status: SHIPPED | OUTPATIENT
Start: 2025-06-05

## 2025-06-05 ASSESSMENT — PAIN - FUNCTIONAL ASSESSMENT: PAIN_FUNCTIONAL_ASSESSMENT: 0-10

## 2025-06-05 ASSESSMENT — PAIN DESCRIPTION - LOCATION: LOCATION: LEG

## 2025-06-05 ASSESSMENT — PAIN DESCRIPTION - ORIENTATION: ORIENTATION: RIGHT

## 2025-06-05 ASSESSMENT — PAIN DESCRIPTION - DESCRIPTORS: DESCRIPTORS: ACHING

## 2025-06-05 NOTE — PROGRESS NOTES
[facility]  [facility] MEDICINE  [address]  [facility] [address]  Dept: [phone]     Date of Visit:  2025  Patient Name: Cameron Washington   Patient :  1950     CHIEF COMPLAINT/HPI:     Cameron Washington is a 74 y.o. male who presents today for an general visit to be evaluated for the following condition(s):  Chief Complaint   Patient presents with    Foot Swelling     Right side    Leg Swelling     Right side   Patient with back pain radiating down his leg.  Has started noticing swelling RLE.  Has been using icy hot with lidocaine    REVIEW OF SYSTEM      Review of Systems   Respiratory:  Negative for chest tightness and shortness of breath.    Cardiovascular:  Negative for chest pain.         REVIEWED INFORMATION      Allergies   Allergen Reactions    Latex Rash    Alcohol      Sinus problems    Dust Mite Extract     Cephalexin Rash       Current Outpatient Medications   Medication Sig Dispense Refill    clotrimazole-betamethasone (LOTRISONE) 1-0.05 % cream Apply topically 2 times daily. (Patient not taking: Reported on 2025) 45 g 0    predniSONE (DELTASONE) 20 MG tablet 60 mg X 3 days then 40 mg X 3 days then 20 mg X 3 days then off; take with food (Patient not taking: Reported on 2025) 18 tablet 0    tadalafil (CIALIS) 5 MG tablet TAKE 1 TABLET BY MOUTH ONCE DAILY AS NEEDED 90 tablet 0    valsartan (DIOVAN) 160 MG tablet Take 1 tablet by mouth daily 90 tablet 3    atorvastatin (LIPITOR) 20 MG tablet Take 1 tablet by mouth daily 90 tablet 1    ibuprofen (ADVIL;MOTRIN) 800 MG tablet Take 1 tablet by mouth every 8 hours as needed for Pain 90 tablet 0    fluticasone (FLONASE) 50 MCG/ACT nasal spray 2 sprays by Each Nostril route daily (Patient not taking: Reported on 2025) 16 g 0    aspirin 81 MG tablet Take 1 tablet by mouth daily       No current facility-administered medications for this visit.        Patient Active Problem List

## 2025-06-05 NOTE — ED PROVIDER NOTES
[facility] EMERGENCY DEPARTMENT  EMERGENCY DEPARTMENT ENCOUNTER      Pt Name: Cameron Washington  MRN: [id number]  Birthdate 1950  Date of evaluation: 6/5/2025  Provider: KARMA Garcia CNP  8:23 PM    CHIEF COMPLAINT       Chief Complaint   Patient presents with    Leg Swelling     Swelling to right foot/leg, noticed today; sent over from [facility] for DVT rule out         HISTORY OF PRESENT ILLNESS    Cameron Washington is a 74 y.o. male who presents to the emergency department with complaints of right leg pain and swelling.  States that started earlier today while out of work, patient works third shift, states that he has pain in the lateral posterior knee.  Denies being on a blood thinner, also denies on VNA diuretics.  Patient also denies chest pain shortness of breath, abdominal pain, dysuria complaints, headache, change in vision, dizziness/lightheaded.  Patient denies history of DVTs or PEs.  Denies any recent extended travel.  Denies smoker or smoking history.  Denies any recent injuries, trauma or fall.    HPI    Nursing Notes were reviewed.    REVIEW OF SYSTEMS       Review of Systems    Except as noted above the remainder of the review of systems was reviewed and negative.       PAST MEDICAL HISTORY     Past Medical History:   Diagnosis Date    Arteriosclerotic heart disease     Asthma     Long time ago    Cellulitis of right upper limb     Resolved    Elevated prostate specific antigen (PSA)     Resolved    Essential hypertension     H/O colonoscopy     Fiberoptic    Intervertebral disc disorders with radiculopathy, lumbar region     Lesion of ulnar nerve, right upper limb     Resolved    Mixed hyperlipidemia     Personal history of other diseases of the respiratory system     Resolved    Prolonged emergence from general anesthesia     Unspecified fracture of shaft of humerus, right arm, initial encounter for closed fracture     Resolved         SURGICAL HISTORY       Past Surgical    Height: 1.778 m (5' 10\")       Based on patient's presenting symptoms, physical exam and medical history will get CBC, BMP, PT/INR, BNP and order bilateral duplex lower extremities.    Lab work was reviewed, BNP was 128, no priors to the hospitals hospital instructed the patient to follow-up with his primary care doctor in regards to this lab value.  Doppler study indicates no DVTs in either extremity.  I discussed these results with the patient is agreeable to discharge plan follow-up with primary care doctor.  Encouraged the patient to get compression socks or KRISTEN hose to help combat the swelling or elevate the affected extremities.    Medical Decision Making  Amount and/or Complexity of Data Reviewed  Labs: ordered. Decision-making details documented in ED Course.            REASSESSMENT     ED Course as of 06/05/25 2023   u Jun 05, 2025 1945 Brain Natriuretic Peptide(!):    NT Pro-(!) [TS]   1945 CBC with Auto Differential:    WBC 6.8   RBC 4.75   Hemoglobin Quant 14.1   Hematocrit 43.0   MCV 90.7   MCH 29.7   MCHC 32.8   RDW 14.3   Platelet Count 229   MPV 8.1   Neutrophils % 55   Lymphocyte % 34   Monocytes % 9   Eosinophils % 2   Basophils % 0   Neutrophils Absolute 3.70   Lymphocytes Absolute 2.30   Monocytes Absolute 0.60   Eosinophils Absolute 0.10   Basophils Absolute 0.00 [TS]   1945 Basic Metabolic Panel:    Sodium 141   Potassium 3.8   Chloride 106   CARBON DIOXIDE 25   Anion Gap 10   Glucose 97   BUN,BUNPL 17   Creatinine 0.9   Est, Glom Filt Rate 90   Calcium 8.7 [TS]   1945 Protime-INR:    Prothrombin Time 13.5   INR 1.0 [TS]   2001 Venous doppler preliminary results:  ·  No evidence of deep vein thrombosis in the right lower extremity.  ·  No evidence of deep vein thrombosis in the left lower extremity.  ·  There are fluid filled areas seen in the popliteal fossa bilaterally that could be indicative of Baker's cysts.   [AO]   2016 Brain Natriuretic Peptide(!):    NT Pro-(!) [TS]

## 2025-06-05 NOTE — ED PROVIDER NOTES
[facility] Emergency Department  [address]  [facility] [address]  Phone: [phone]  Fax: [phone]      Pt Name: Cameron Washington  MRN: [id number]  Birthdate 1950  Date of evaluation: 6/5/25    CHIEF COMPLAINT       Chief Complaint   Patient presents with    Leg Swelling     Swelling to right foot/leg, noticed today; sent over from [facility] for DVT rule out       PAST MEDICAL HISTORY    has a past medical history of Arteriosclerotic heart disease, Asthma, Cellulitis of right upper limb, Elevated prostate specific antigen (PSA), Essential hypertension, H/O colonoscopy, Intervertebral disc disorders with radiculopathy, lumbar region, Lesion of ulnar nerve, right upper limb, Mixed hyperlipidemia, Personal history of other diseases of the respiratory system, Prolonged emergence from general anesthesia, and Unspecified fracture of shaft of humerus, right arm, initial encounter for closed fracture.    SURGICAL HISTORY      has a past surgical history that includes ventral hernia repair; back surgery; Elbow surgery; Knee arthroscopy (Right); Colonoscopy; Shoulder arthroscopy (Right, 9/5/2024); and Cardiac procedure (N/A, 12/18/2024).    CURRENT MEDICATIONS       Discharge Medication List as of 6/5/2025  8:23 PM        CONTINUE these medications which have NOT CHANGED    Details   clotrimazole-betamethasone (LOTRISONE) 1-0.05 % cream Apply topically 2 times daily., Disp-45 g, R-0, Normal      predniSONE (DELTASONE) 20 MG tablet 60 mg X 3 days then 40 mg X 3 days then 20 mg X 3 days then off; take with food, Disp-18 tablet, R-0Normal      tadalafil (CIALIS) 5 MG tablet TAKE 1 TABLET BY MOUTH ONCE DAILY AS NEEDED, Disp-90 tablet, R-0Normal      valsartan (DIOVAN) 160 MG tablet Take 1 tablet by mouth daily, Disp-90 tablet, R-3Normal      atorvastatin (LIPITOR) 20 MG tablet Take 1 tablet by mouth daily, Disp-90 tablet, R-1Normal      ibuprofen (ADVIL;MOTRIN) 800 MG tablet Take 1 tablet by

## 2025-06-06 ENCOUNTER — OFFICE VISIT (OUTPATIENT)
Age: 75
End: 2025-06-06
Payer: COMMERCIAL

## 2025-06-06 VITALS
WEIGHT: 250 LBS | SYSTOLIC BLOOD PRESSURE: 132 MMHG | DIASTOLIC BLOOD PRESSURE: 88 MMHG | RESPIRATION RATE: 12 BRPM | OXYGEN SATURATION: 96 % | HEIGHT: 70 IN | HEART RATE: 55 BPM | BODY MASS INDEX: 35.79 KG/M2

## 2025-06-06 DIAGNOSIS — M54.16 LUMBAR RADICULOPATHY: ICD-10-CM

## 2025-06-06 DIAGNOSIS — M51.9 LUMBAR DISC DISEASE: Primary | ICD-10-CM

## 2025-06-06 LAB — ECHO BSA: 2.39 M2

## 2025-06-06 PROCEDURE — 99213 OFFICE O/P EST LOW 20 MIN: CPT | Performed by: FAMILY MEDICINE

## 2025-06-06 PROCEDURE — 3075F SYST BP GE 130 - 139MM HG: CPT | Performed by: FAMILY MEDICINE

## 2025-06-06 PROCEDURE — 96372 THER/PROPH/DIAG INJ SC/IM: CPT | Performed by: FAMILY MEDICINE

## 2025-06-06 PROCEDURE — 3079F DIAST BP 80-89 MM HG: CPT | Performed by: FAMILY MEDICINE

## 2025-06-06 PROCEDURE — 93970 EXTREMITY STUDY: CPT | Performed by: SURGERY

## 2025-06-06 PROCEDURE — 1123F ACP DISCUSS/DSCN MKR DOCD: CPT | Performed by: FAMILY MEDICINE

## 2025-06-06 RX ORDER — METHYLPREDNISOLONE ACETATE 80 MG/ML
80 INJECTION, SUSPENSION INTRA-ARTICULAR; INTRALESIONAL; INTRAMUSCULAR; SOFT TISSUE ONCE
Status: COMPLETED | OUTPATIENT
Start: 2025-06-06 | End: 2025-06-06

## 2025-06-06 RX ORDER — CYCLOBENZAPRINE HCL 5 MG
5 TABLET ORAL 3 TIMES DAILY PRN
Qty: 30 TABLET | Refills: 0 | Status: SHIPPED | OUTPATIENT
Start: 2025-06-06 | End: 2025-06-16

## 2025-06-06 RX ORDER — CELECOXIB 200 MG/1
200 CAPSULE ORAL 2 TIMES DAILY
Qty: 60 CAPSULE | Refills: 1 | Status: SHIPPED | OUTPATIENT
Start: 2025-06-06

## 2025-06-06 RX ADMIN — METHYLPREDNISOLONE ACETATE 80 MG: 80 INJECTION, SUSPENSION INTRA-ARTICULAR; INTRALESIONAL; INTRAMUSCULAR; SOFT TISSUE at 13:42

## 2025-06-06 ASSESSMENT — ENCOUNTER SYMPTOMS
CHEST TIGHTNESS: 0
SHORTNESS OF BREATH: 0

## 2025-06-06 NOTE — PROGRESS NOTES
[facility]  [facility]  [address]  [place] [address]  Dept: [phone]     Date of Visit:  2025  Patient Name: Cameron Washington   Patient :  1950     CHIEF COMPLAINT/HPI:     Cameron Washington is a 74 y.o. male who presents today for an general visit to be evaluated for the following condition(s):  Chief Complaint   Patient presents with    Follow-up     [facility] leg swelling    Back Pain   Patient having problems with lower back pain 8-10 and is taking acetaminophen.  His leg pain is persistent- venous doppler in ER was negative for clot- showed possible Baker's cyst.    REVIEW OF SYSTEM      Review of Systems   Respiratory:  Negative for chest tightness and shortness of breath.    Cardiovascular:  Negative for chest pain.         REVIEWED INFORMATION      Allergies   Allergen Reactions    Latex Rash    Alcohol      Sinus problems    Dust Mite Extract     Cephalexin Rash       Current Outpatient Medications   Medication Sig Dispense Refill    celecoxib (CELEBREX) 200 MG capsule Take 1 capsule by mouth 2 times daily 60 capsule 1    cyclobenzaprine (FLEXERIL) 5 MG tablet Take 1 tablet by mouth 3 times daily as needed for Muscle spasms 30 tablet 0    tadalafil (CIALIS) 5 MG tablet TAKE 1 TABLET BY MOUTH ONCE DAILY AS NEEDED 90 tablet 0    valsartan (DIOVAN) 160 MG tablet Take 1 tablet by mouth daily 90 tablet 3    atorvastatin (LIPITOR) 20 MG tablet Take 1 tablet by mouth daily 90 tablet 1    ibuprofen (ADVIL;MOTRIN) 800 MG tablet Take 1 tablet by mouth every 8 hours as needed for Pain 90 tablet 0    aspirin 81 MG tablet Take 1 tablet by mouth daily      clotrimazole-betamethasone (LOTRISONE) 1-0.05 % cream Apply topically 2 times daily. (Patient not taking: Reported on 2025) 45 g 0    predniSONE (DELTASONE) 20 MG tablet 60 mg X 3 days then 40 mg X 3 days then 20 mg X 3 days then off; take with food (Patient not taking: Reported on

## 2025-06-06 NOTE — PROGRESS NOTES
After obtaining consent, and per orders of Dr. Rodgers, injection of depo-80 given in left ventrogluteal  by Maximus Machado MA. Patient tolerated the injection well.   Documentation of Injection waste per [place] guidelines:      Was there any injection waste during this visit?   YES OR NO: No      If yes,  Medication:   Amount Wasted:   Reason for Waste:   Disposal Method:

## 2025-06-09 ASSESSMENT — ENCOUNTER SYMPTOMS
SHORTNESS OF BREATH: 0
CHEST TIGHTNESS: 0

## 2025-08-11 RX ORDER — TADALAFIL 5 MG/1
TABLET ORAL
Qty: 90 TABLET | Refills: 0 | Status: SHIPPED | OUTPATIENT
Start: 2025-08-11

## (undated) DEVICE — CATHETER ANGIO JL3.5 0.056 INX6 FRX100 CM THRULUMEN EXPO

## (undated) DEVICE — LIQUIBAND RAPID ADHESIVE 36/CS 0.8ML: Brand: MEDLINE

## (undated) DEVICE — TRAY SURG CUST CRD CATH TOLEDO

## (undated) DEVICE — GLOVE ORTHO 7 1/2   MSG9475

## (undated) DEVICE — STOCKINETTE,IMPERVIOUS,12X48,STERILE: Brand: MEDLINE

## (undated) DEVICE — DEVICE COMPR LNG 27 CM VASC BND

## (undated) DEVICE — MHPB ARTHROSCOPY PACK: Brand: MEDLINE INDUSTRIES, INC.

## (undated) DEVICE — BLADE SHV L13CM DIA4MM EXCALIBUR AGG COOLCUT

## (undated) DEVICE — INTENT TO BE USED WITH SUTURE MATERIAL FOR TISSUE CLOSURE: Brand: RICHARD-ALLAN®  NEEDLE 1/2 CIRCLE REVERSE CUTTING

## (undated) DEVICE — ELECTRODE ES L3IN S STL BLDE INSUL DISP VALLEYLAB EDGE

## (undated) DEVICE — BLANKET WRM W40.2XL55.9IN IORT LO BODY + MISTRAL AIR

## (undated) DEVICE — BANDAGE COBAN 6 IN WND 6INX5YD FOAM

## (undated) DEVICE — SOLUTION IRRIG 3000ML 0.9% SOD CHL USP UROMATIC PLAS CONT

## (undated) DEVICE — IMMOBILIZER SHLDR AD XL L20IN D10IN BLK POLY COT CLSR ENV

## (undated) DEVICE — CLOTH PRE OP W9XL10.5IN 2% CHG FRAGRANCE RNS FREE READYPREP

## (undated) DEVICE — YANKAUER,SMOOTH HANDLE,HIGH CAPACITY: Brand: MEDLINE INDUSTRIES, INC.

## (undated) DEVICE — GLOVE SURG SZ 8 L12IN THK75MIL DK GRN LTX FREE

## (undated) DEVICE — ELECTRODE PT RET AD L9FT HI MOIST COND ADH HYDRGEL CORDED

## (undated) DEVICE — ANGIOGRAPHIC CATHETER: Brand: EXPO™

## (undated) DEVICE — SUTURE PROL SZ 1 L60IN NONABSORBABLE BLU L65MM TP-1 3/8 CIR 8824G

## (undated) DEVICE — SUTURE PROL SZ 3-0 L18IN NONABSORBABLE BLU L24MM FS-1 3/8 8684G

## (undated) DEVICE — DRAPE,SHOULDER,BEACH CHAIR,STERILE: Brand: MEDLINE

## (undated) DEVICE — LARGE TEAR CROSS CUT RASP (14.0 X 7.0MM)

## (undated) DEVICE — STRAP,POSITIONING,KNEE/BODY,FOAM,4X60": Brand: MEDLINE

## (undated) DEVICE — SUTURE ETHIBOND 2 L30IN NONABSORBABLE GRN WHT LT GRN MO-7 D8793

## (undated) DEVICE — DRESSING FOAM W4XL4IN AG SIL FACE BORD IONIC ANTIMIC ADH

## (undated) DEVICE — POUCH INSTR W6.75XL11.5IN FRST 2 PKT ADH FOR ORTH AND

## (undated) DEVICE — CATHETER DIAG AD 6FR L100CM COR GRN HYDRPHLC NYL JR 4 W/O

## (undated) DEVICE — SOLUTION IRRIG 1000ML 0.9% SOD CHL USP POUR PLAS BTL

## (undated) DEVICE — SUTURE VICRYL + SZ 3-0 L36IN ABSRB UD L36MM CT-1 1/2 CIR VCP944H

## (undated) DEVICE — STRAP ARMBRD W1.5XL32IN FOAM STR YET SFT W/ HK AND LOOP

## (undated) DEVICE — APPLICATOR MEDICATED 26 CC SOLUTION HI LT ORNG CHLORAPREP

## (undated) DEVICE — GLOVE SURG SZ 65 L12IN THK75MIL DK GRN LTX FREE

## (undated) DEVICE — GLIDESHEATH SLENDER STAINLESS STEEL KIT: Brand: GLIDESHEATH SLENDER

## (undated) DEVICE — SUTURE MONOCRYL SZ 3 0 L18IN ABSRB UD PS 2 3 8 CIR REV CUT NDL MCP497G

## (undated) DEVICE — Device

## (undated) DEVICE — TUBING PMP L16FT MAIN DISP FOR AR-6400 AR-6475 Â€“ ORDER MULTIPLES OF 10 EACH

## (undated) DEVICE — GLOVE SURG SZ 65 L12IN FNGR THK126MIL CRM LTX FREE

## (undated) DEVICE — NEPTUNE E-SEP SMOKE EVACUATION PENCIL, COATED, 70MM BLADE, PUSH BUTTON SWITCH: Brand: NEPTUNE E-SEP